# Patient Record
Sex: FEMALE | Race: WHITE | NOT HISPANIC OR LATINO | Employment: OTHER | URBAN - NONMETROPOLITAN AREA
[De-identification: names, ages, dates, MRNs, and addresses within clinical notes are randomized per-mention and may not be internally consistent; named-entity substitution may affect disease eponyms.]

---

## 2017-10-20 ENCOUNTER — OFFICE VISIT (OUTPATIENT)
Dept: INTERNAL MEDICINE | Facility: CLINIC | Age: 57
End: 2017-10-20

## 2017-10-20 ENCOUNTER — TELEPHONE (OUTPATIENT)
Dept: INTERNAL MEDICINE | Facility: CLINIC | Age: 57
End: 2017-10-20

## 2017-10-20 VITALS
DIASTOLIC BLOOD PRESSURE: 80 MMHG | SYSTOLIC BLOOD PRESSURE: 122 MMHG | TEMPERATURE: 97.9 F | HEART RATE: 73 BPM | OXYGEN SATURATION: 98 % | BODY MASS INDEX: 22.86 KG/M2 | RESPIRATION RATE: 12 BRPM | HEIGHT: 63 IN | WEIGHT: 129 LBS

## 2017-10-20 DIAGNOSIS — Z98.84 S/P GASTRIC BYPASS: Chronic | ICD-10-CM

## 2017-10-20 DIAGNOSIS — G43.909 MIGRAINE WITHOUT STATUS MIGRAINOSUS, NOT INTRACTABLE, UNSPECIFIED MIGRAINE TYPE: ICD-10-CM

## 2017-10-20 DIAGNOSIS — G89.4 CHRONIC PAIN SYNDROME: ICD-10-CM

## 2017-10-20 DIAGNOSIS — E55.9 VITAMIN D DEFICIENCY: ICD-10-CM

## 2017-10-20 DIAGNOSIS — Z86.711 HISTORY OF PULMONARY EMBOLISM: Chronic | ICD-10-CM

## 2017-10-20 DIAGNOSIS — M79.2 NERVE PAIN: Primary | ICD-10-CM

## 2017-10-20 DIAGNOSIS — M81.0 OSTEOPOROSIS, UNSPECIFIED OSTEOPOROSIS TYPE, UNSPECIFIED PATHOLOGICAL FRACTURE PRESENCE: ICD-10-CM

## 2017-10-20 DIAGNOSIS — J30.89 ENVIRONMENTAL AND SEASONAL ALLERGIES: ICD-10-CM

## 2017-10-20 DIAGNOSIS — Z23 NEED FOR VACCINATION: ICD-10-CM

## 2017-10-20 PROCEDURE — 90686 IIV4 VACC NO PRSV 0.5 ML IM: CPT | Performed by: FAMILY MEDICINE

## 2017-10-20 PROCEDURE — 90471 IMMUNIZATION ADMIN: CPT | Performed by: FAMILY MEDICINE

## 2017-10-20 PROCEDURE — 99204 OFFICE O/P NEW MOD 45 MIN: CPT | Performed by: FAMILY MEDICINE

## 2017-10-20 RX ORDER — TOPIRAMATE 100 MG/1
1 TABLET, FILM COATED ORAL 2 TIMES DAILY
Refills: 0 | COMMUNITY
Start: 2017-09-25 | End: 2017-10-20 | Stop reason: SDUPTHER

## 2017-10-20 RX ORDER — ALENDRONATE SODIUM 70 MG/1
70 TABLET ORAL WEEKLY
Qty: 4 TABLET | Refills: 1 | Status: SHIPPED | OUTPATIENT
Start: 2017-10-20 | End: 2017-12-20 | Stop reason: SDUPTHER

## 2017-10-20 RX ORDER — FLUTICASONE PROPIONATE 50 MCG
2 SPRAY, SUSPENSION (ML) NASAL DAILY
Qty: 1 BOTTLE | Refills: 1 | Status: SHIPPED | OUTPATIENT
Start: 2017-10-20 | End: 2018-05-22 | Stop reason: SDUPTHER

## 2017-10-20 RX ORDER — SUCRALFATE 1 G/1
1 TABLET ORAL 4 TIMES DAILY
Refills: 0 | COMMUNITY
Start: 2017-09-07 | End: 2017-10-20 | Stop reason: SDUPTHER

## 2017-10-20 RX ORDER — PROMETHAZINE HYDROCHLORIDE 25 MG/1
TABLET ORAL
COMMUNITY
Start: 2017-10-18 | End: 2018-05-08

## 2017-10-20 RX ORDER — FLUTICASONE PROPIONATE 50 MCG
SPRAY, SUSPENSION (ML) NASAL
Refills: 0 | COMMUNITY
Start: 2017-08-16 | End: 2017-10-20 | Stop reason: SDUPTHER

## 2017-10-20 RX ORDER — ALENDRONATE SODIUM 70 MG/1
1 TABLET ORAL WEEKLY
Refills: 0 | COMMUNITY
Start: 2017-10-01 | End: 2017-10-20 | Stop reason: SDUPTHER

## 2017-10-20 RX ORDER — FEXOFENADINE HCL 180 MG
1 TABLET ORAL
Refills: 0 | COMMUNITY
Start: 2017-10-02 | End: 2018-05-22 | Stop reason: SDUPTHER

## 2017-10-20 RX ORDER — SUCRALFATE 1 G/1
1 TABLET ORAL 4 TIMES DAILY
Qty: 120 TABLET | Refills: 1 | Status: SHIPPED | OUTPATIENT
Start: 2017-10-20 | End: 2017-12-20 | Stop reason: SDUPTHER

## 2017-10-20 RX ORDER — GABAPENTIN 800 MG/1
1 TABLET ORAL 4 TIMES DAILY
Refills: 0 | COMMUNITY
Start: 2017-10-09 | End: 2017-10-20 | Stop reason: SDUPTHER

## 2017-10-20 RX ORDER — HYDROXYZINE PAMOATE 25 MG/1
1 CAPSULE ORAL NIGHTLY
COMMUNITY
Start: 2017-10-18 | End: 2017-10-20 | Stop reason: SDUPTHER

## 2017-10-20 RX ORDER — ERGOCALCIFEROL 1.25 MG/1
50000 CAPSULE ORAL WEEKLY
Qty: 30 CAPSULE | Refills: 1 | Status: SHIPPED | OUTPATIENT
Start: 2017-10-20 | End: 2018-04-02 | Stop reason: SDUPTHER

## 2017-10-20 RX ORDER — METHOCARBAMOL 500 MG/1
500 TABLET, FILM COATED ORAL 4 TIMES DAILY PRN
Qty: 120 TABLET | Refills: 1 | Status: SHIPPED | OUTPATIENT
Start: 2017-10-20 | End: 2017-10-23 | Stop reason: SDUPTHER

## 2017-10-20 RX ORDER — TOPIRAMATE 100 MG/1
100 TABLET, FILM COATED ORAL 2 TIMES DAILY
Qty: 60 TABLET | Refills: 1 | Status: SHIPPED | OUTPATIENT
Start: 2017-10-20 | End: 2017-12-20 | Stop reason: SDUPTHER

## 2017-10-20 RX ORDER — PREDNISONE 20 MG/1
TABLET ORAL
COMMUNITY
Start: 2017-10-18 | End: 2017-10-20

## 2017-10-20 RX ORDER — ERGOCALCIFEROL 1.25 MG/1
1 CAPSULE ORAL WEEKLY
Refills: 1 | COMMUNITY
Start: 2017-10-01 | End: 2017-10-20 | Stop reason: SDUPTHER

## 2017-10-20 RX ORDER — HYDROXYZINE PAMOATE 25 MG/1
25 CAPSULE ORAL NIGHTLY
Qty: 30 CAPSULE | Refills: 1 | Status: SHIPPED | OUTPATIENT
Start: 2017-10-20 | End: 2017-11-20

## 2017-10-20 RX ORDER — GABAPENTIN 800 MG/1
800 TABLET ORAL 4 TIMES DAILY
Qty: 120 TABLET | Refills: 0 | Status: SHIPPED | OUTPATIENT
Start: 2017-10-20 | End: 2017-11-16 | Stop reason: SDUPTHER

## 2017-10-20 NOTE — TELEPHONE ENCOUNTER
States that when she picked up her meds that the muscle relaxer was not received. Can you please call that to [facility]?
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
## 2017-10-22 PROBLEM — Z98.84 S/P GASTRIC BYPASS: Chronic | Status: ACTIVE | Noted: 2017-10-22

## 2017-10-22 PROBLEM — Z86.711 HISTORY OF PULMONARY EMBOLISM: Chronic | Status: ACTIVE | Noted: 2017-10-22

## 2017-10-23 RX ORDER — METHOCARBAMOL 500 MG/1
500 TABLET, FILM COATED ORAL 4 TIMES DAILY PRN
Qty: 120 TABLET | Refills: 1 | Status: SHIPPED | OUTPATIENT
Start: 2017-10-23 | End: 2018-01-02 | Stop reason: SINTOL

## 2017-10-26 ENCOUNTER — OFFICE VISIT (OUTPATIENT)
Dept: INTERNAL MEDICINE | Facility: CLINIC | Age: 57
End: 2017-10-26

## 2017-10-26 VITALS
HEIGHT: 63 IN | OXYGEN SATURATION: 97 % | BODY MASS INDEX: 22.86 KG/M2 | HEART RATE: 81 BPM | DIASTOLIC BLOOD PRESSURE: 82 MMHG | TEMPERATURE: 97 F | WEIGHT: 129 LBS | SYSTOLIC BLOOD PRESSURE: 122 MMHG | RESPIRATION RATE: 12 BRPM

## 2017-10-26 DIAGNOSIS — R53.83 FATIGUE, UNSPECIFIED TYPE: Primary | ICD-10-CM

## 2017-10-26 DIAGNOSIS — E55.9 VITAMIN D INSUFFICIENCY: ICD-10-CM

## 2017-10-26 DIAGNOSIS — M25.50 POLYARTHRALGIA: ICD-10-CM

## 2017-10-26 DIAGNOSIS — R79.89 ABNORMAL CBC: ICD-10-CM

## 2017-10-26 DIAGNOSIS — G89.4 CHRONIC PAIN SYNDROME: ICD-10-CM

## 2017-10-26 DIAGNOSIS — R20.0 ARM NUMBNESS LEFT: ICD-10-CM

## 2017-10-26 DIAGNOSIS — R94.31 SHORTENED PR INTERVAL: ICD-10-CM

## 2017-10-26 DIAGNOSIS — R53.1 WEAKNESS: ICD-10-CM

## 2017-10-26 PROCEDURE — 99214 OFFICE O/P EST MOD 30 MIN: CPT | Performed by: FAMILY MEDICINE

## 2017-10-26 PROCEDURE — 93000 ELECTROCARDIOGRAM COMPLETE: CPT | Performed by: FAMILY MEDICINE

## 2017-10-26 NOTE — PROGRESS NOTES
Subjective    Connie Felty is a 57 y.o. female here for:  Chief Complaint   Patient presents with   • Shoulder Pain   • Tingling     in bilateral arms and hands, also c/o weakness     History of Present Illness   Having increased muscle pain in shoulders and hips. Arms have been tingling, also in shoulder blades. When she gets up it's in leg and hip area as well. It's like her motor skills are not normal. Joints don't feel they're working right. She feels she has no strength. Tried to  grandson but could not. Feels washed out. Has felt this way for a week and it's worsened. Both sides of arms and legs. Back on gabapentin, now able to sleep and has helped some nerve pain. She is thinking most of her issue is being off her narcotic pain reliever. At her first visit she felt she would be fine without it but now she is thinking medicine is needed. She feels symptoms started when she ran out of narcotic. No known coronary artery disease, but notes some numbness in the left arm and lips feel tingly. She was told at one time she had a heart attack but later another doctor said she did not. Patient has not had feeling like this before.    The following portions of the patient's history were reviewed and updated as appropriate: allergies, current medications, past family history, past medical history, past social history, past surgical history and problem list.    Review of Systems   Constitutional: Positive for activity change and fatigue.   Respiratory: Positive for shortness of breath.    Musculoskeletal: Positive for arthralgias, back pain and gait problem.   Neurological: Positive for weakness and numbness. Negative for syncope.       Vitals:    10/26/17 1149   BP: 122/82   Pulse: 81   Resp: 12   Temp: 97 °F (36.1 °C)   SpO2: 97%       Objective     Physical Exam   Constitutional: She is oriented to person, place, and time. Vital signs are normal. She appears well-developed and well-nourished. She is active. She  does not have a sickly appearance. She does not appear ill.   Appears stated age. Well groomed. normal body weight. Not standing and pacing this visit as she was last time but still appears a bit uncomfortable.   HENT:   Head: Normocephalic and atraumatic. Hair is normal.   Right Ear: Hearing normal.   Left Ear: Hearing normal.   Nose: Nose normal.   Eyes: EOM and lids are normal. Pupils are equal, round, and reactive to light. No scleral icterus.   Neck: Phonation normal. Neck supple.   Cardiovascular: Normal rate, regular rhythm and normal heart sounds.  Exam reveals no gallop and no friction rub.    No murmur heard.  Pulmonary/Chest: Effort normal and breath sounds normal.   Musculoskeletal: She exhibits no deformity.        Back:    Neurological: She is alert and oriented to person, place, and time. She displays no tremor. No cranial nerve deficit. Gait (limp slightly) abnormal.   Skin: Skin is warm. No rash noted. She is not diaphoretic. No cyanosis. No pallor. Nails show no clubbing.   Psychiatric: Her speech is normal and behavior is normal. Judgment and thought content normal. Her mood appears anxious. Cognition and memory are normal.   Nursing note and vitals reviewed.      ECG 12 Lead  Date/Time: 10/26/2017 12:35 PM  Performed by: DENZEL AGRAWAL  Authorized by: DENZEL AGRAWAL   Comparison: compared with previous ECG from 6/2/2017  Similar to previous ECG  Comparison to previous ECG: Old EKG page 349 in past records. Essentially no change.  Rhythm: sinus rhythm  Rate: bradycardic  BPM: 64  Conduction comments: Short MD interval (94 ms)  ST Segments: ST segments normal  T depression: V2 and V1  QRS axis: normal  Q waves: III, II and aVF (only one q wave aVF out of the two beats)  Clinical impression: abnormal ECG        I looked over her previous EKG which also noted a shortened MD interval. Past labs show at times WBC <4 but other times normal. She denies known issues with immune  system.    Assessment/Plan   Debra was seen today for shoulder pain and tingling.    Diagnoses and all orders for this visit:    Fatigue, unspecified type  -     CBC & Differential  -     Comprehensive Metabolic Panel  -     Iron Profile  -     Vitamin B12 & Folate  -     Vitamin D 25 Hydroxy  -     TSH  -     T4, Free  -     ECG 12 Lead    Arm numbness left  -     ECG 12 Lead    Weakness  -     ECG 12 Lead    Chronic pain syndrome  -     Ambulatory Referral to Pain Management    Polyarthralgia  -     CK  -     ARIELA  -     C-reactive Protein  -     Rheumatoid Factor, Quant  -     Sedimentation Rate    Abnormal CBC  -     CBC & Differential    Vitamin D insufficiency  -     Vitamin D 25 Hydroxy    Shortened MD interval  -     ECG 12 Lead      · Symptoms may be related to not having narcotic, but not definitive. Referring to pain clnic but no narcotic prescribed. Suggested topical NSAIDs, she says they don't help. I then encouraged use of capsaicin.  · EKG somewhat concerning for Erol-Ewdtuz-Ntgkqr syndrome (no delta waves to suggest WPW) but she does not have the clinical history to support this. Will monitor.          Eugenie Rivera MD

## 2017-10-30 LAB
25(OH)D3+25(OH)D2 SERPL-MCNC: 43.2 NG/ML
ALBUMIN SERPL-MCNC: 4.3 G/DL (ref 3.5–5)
ALBUMIN/GLOB SERPL: 1.8 G/DL (ref 1–2)
ALP SERPL-CCNC: 78 U/L (ref 38–126)
ALT SERPL-CCNC: 30 U/L (ref 13–69)
ANA SER QL: NEGATIVE
AST SERPL-CCNC: 28 U/L (ref 15–46)
BASOPHILS # BLD AUTO: 0.02 10*3/MM3 (ref 0–0.2)
BASOPHILS NFR BLD AUTO: 0.4 % (ref 0–2.5)
BILIRUB SERPL-MCNC: 0.6 MG/DL (ref 0.2–1.3)
BUN SERPL-MCNC: 12 MG/DL (ref 7–20)
BUN/CREAT SERPL: 13.3 (ref 7.1–23.5)
CALCIUM SERPL-MCNC: 9 MG/DL (ref 8.4–10.2)
CHLORIDE SERPL-SCNC: 109 MMOL/L (ref 98–107)
CK SERPL-CCNC: 39 U/L (ref 30–170)
CO2 SERPL-SCNC: 22 MMOL/L (ref 26–30)
CREAT SERPL-MCNC: 0.9 MG/DL (ref 0.6–1.3)
CRP SERPL-MCNC: <0.5 MG/DL (ref 0–1)
EOSINOPHIL # BLD AUTO: 0.09 10*3/MM3 (ref 0–0.7)
EOSINOPHIL NFR BLD AUTO: 1.6 % (ref 0–7)
ERYTHROCYTE [DISTWIDTH] IN BLOOD BY AUTOMATED COUNT: 13.6 % (ref 11.5–14.5)
ERYTHROCYTE [SEDIMENTATION RATE] IN BLOOD BY WESTERGREN METHOD: 2 MM/HR (ref 0–20)
FOLATE SERPL-MCNC: 5.66 NG/ML
GFR SERPLBLD CREATININE-BSD FMLA CKD-EPI: 65 ML/MIN/1.73
GFR SERPLBLD CREATININE-BSD FMLA CKD-EPI: 78 ML/MIN/1.73
GLOBULIN SER CALC-MCNC: 2.4 GM/DL
GLUCOSE SERPL-MCNC: 111 MG/DL (ref 74–98)
HCT VFR BLD AUTO: 47.3 % (ref 37–47)
HGB BLD-MCNC: 14.8 G/DL (ref 12–16)
IMM GRANULOCYTES # BLD: 0.02 10*3/MM3 (ref 0–0.06)
IMM GRANULOCYTES NFR BLD: 0.4 % (ref 0–0.6)
IRON SATN MFR SERPL: 34 % (ref 11–46)
IRON SERPL-MCNC: 130 MCG/DL (ref 37–181)
LYMPHOCYTES # BLD AUTO: 2.79 10*3/MM3 (ref 0.6–3.4)
LYMPHOCYTES NFR BLD AUTO: 49.5 % (ref 10–50)
MCH RBC QN AUTO: 29.1 PG (ref 27–31)
MCHC RBC AUTO-ENTMCNC: 31.3 G/DL (ref 30–37)
MCV RBC AUTO: 92.9 FL (ref 81–99)
MONOCYTES # BLD AUTO: 0.36 10*3/MM3 (ref 0–0.9)
MONOCYTES NFR BLD AUTO: 6.4 % (ref 0–12)
NEUTROPHILS # BLD AUTO: 2.36 10*3/MM3 (ref 2–6.9)
NEUTROPHILS NFR BLD AUTO: 41.7 % (ref 37–80)
NRBC BLD AUTO-RTO: 0 /100 WBC (ref 0–0)
PLATELET # BLD AUTO: 191 10*3/MM3 (ref 130–400)
POTASSIUM SERPL-SCNC: 3.9 MMOL/L (ref 3.5–5.1)
PROT SERPL-MCNC: 6.7 G/DL (ref 6.3–8.2)
RBC # BLD AUTO: 5.09 10*6/MM3 (ref 4.2–5.4)
RHEUMATOID FACT SERPL-ACNC: <10 IU/ML (ref 0–13.9)
SODIUM SERPL-SCNC: 145 MMOL/L (ref 137–145)
T4 FREE SERPL-MCNC: 0.79 NG/DL (ref 0.78–2.19)
TIBC SERPL-MCNC: 386 MCG/DL (ref 261–497)
TSH SERPL DL<=0.005 MIU/L-ACNC: 1.92 MIU/ML (ref 0.47–4.68)
UIBC SERPL-MCNC: 256 MCG/DL
VIT B12 SERPL-MCNC: >1000 PG/ML (ref 239–931)
WBC # BLD AUTO: 5.64 10*3/MM3 (ref 4.8–10.8)

## 2017-11-03 ENCOUNTER — TRANSCRIBE ORDERS (OUTPATIENT)
Dept: ADMINISTRATIVE | Facility: HOSPITAL | Age: 57
End: 2017-11-03

## 2017-11-03 DIAGNOSIS — M54.2 CERVICALGIA: ICD-10-CM

## 2017-11-03 DIAGNOSIS — M54.12 RADICULOPATHY, CERVICAL: Primary | ICD-10-CM

## 2017-11-16 DIAGNOSIS — G89.4 CHRONIC PAIN SYNDROME: ICD-10-CM

## 2017-11-16 DIAGNOSIS — M79.2 NERVE PAIN: ICD-10-CM

## 2017-11-17 ENCOUNTER — HOSPITAL ENCOUNTER (OUTPATIENT)
Dept: GENERAL RADIOLOGY | Facility: HOSPITAL | Age: 57
Discharge: HOME | End: 2017-11-17
Admitting: ANESTHESIOLOGY

## 2017-11-17 ENCOUNTER — HOSPITAL ENCOUNTER (OUTPATIENT)
Dept: CT IMAGING | Facility: HOSPITAL | Age: 57
Discharge: HOME | End: 2017-11-17

## 2017-11-17 VITALS
OXYGEN SATURATION: 99 % | DIASTOLIC BLOOD PRESSURE: 72 MMHG | TEMPERATURE: 97.7 F | WEIGHT: 135.2 LBS | HEIGHT: 63 IN | BODY MASS INDEX: 23.96 KG/M2 | SYSTOLIC BLOOD PRESSURE: 127 MMHG | RESPIRATION RATE: 18 BRPM | HEART RATE: 88 BPM

## 2017-11-17 DIAGNOSIS — M54.2 CERVICALGIA: ICD-10-CM

## 2017-11-17 DIAGNOSIS — M54.12 RADICULOPATHY, CERVICAL: ICD-10-CM

## 2017-11-17 PROCEDURE — 72240 MYELOGRAPHY NECK SPINE: CPT

## 2017-11-17 PROCEDURE — 72125 CT NECK SPINE W/O DYE: CPT

## 2017-11-17 PROCEDURE — 0 IOPAMIDOL 61 % SOLUTION: Performed by: PHYSICIAN ASSISTANT

## 2017-11-17 RX ORDER — ALPRAZOLAM 0.5 MG/1
0.5 TABLET ORAL
Status: DISCONTINUED | OUTPATIENT
Start: 2017-11-17 | End: 2017-11-18 | Stop reason: HOSPADM

## 2017-11-17 RX ORDER — LIDOCAINE HYDROCHLORIDE 10 MG/ML
10 INJECTION, SOLUTION INFILTRATION; PERINEURAL ONCE
Status: DISCONTINUED | OUTPATIENT
Start: 2017-11-17 | End: 2017-11-17

## 2017-11-17 RX ORDER — AMITRIPTYLINE HYDROCHLORIDE 25 MG/1
25 TABLET, FILM COATED ORAL NIGHTLY
COMMUNITY
End: 2017-11-20

## 2017-11-17 RX ORDER — GABAPENTIN 800 MG/1
800 TABLET ORAL 4 TIMES DAILY
Qty: 120 TABLET | Refills: 2 | Status: SHIPPED | OUTPATIENT
Start: 2017-11-17 | End: 2017-11-20

## 2017-11-17 RX ADMIN — ALPRAZOLAM 0.5 MG: 0.5 TABLET ORAL at 08:15

## 2017-11-17 RX ADMIN — LIDOCAINE HYDROCHLORIDE 10 ML: 10 INJECTION, SOLUTION INFILTRATION; PERINEURAL at 11:30

## 2017-11-17 RX ADMIN — IOPAMIDOL 15 ML: 612 INJECTION, SOLUTION INTRATHECAL at 11:32

## 2017-11-17 NOTE — POST-PROCEDURE NOTE
Radiology Procedure    Pre-procedure: procedure, risks discussed with patient. Patient understood and consented to procedure     Procedure Performed: cervical myelogram     IV Sedation and/or Anesthesia:  No    Complications: none    Preliminary Findings: pending    Specimen Removed: none    Estimated Blood Loss:  0ml    Post-Procedure Diagnosis: ct C spine, encourage fluids, bed rest x hours    Post-Procedure Plan: ct C spine, encourage fluids, bed rest x hours    Standard Discharge Instructions Given:yes     NIMISHA Francois  11/17/17  11:51 AM

## 2017-11-17 NOTE — PLAN OF CARE
Problem: Myelogram (Adult)  Goal: Signs and Symptoms of Listed Potential Problems Will be Absent or Manageable (Myelogram)  Outcome: Ongoing (interventions implemented as appropriate)    11/17/17 1501   Myelogram   Problems Assessed (Myelogram) all   Problems Present (Myelogram) none

## 2017-11-20 ENCOUNTER — OFFICE VISIT (OUTPATIENT)
Dept: INTERNAL MEDICINE | Facility: CLINIC | Age: 57
End: 2017-11-20

## 2017-11-20 ENCOUNTER — TELEPHONE (OUTPATIENT)
Dept: INTERVENTIONAL RADIOLOGY/VASCULAR | Facility: HOSPITAL | Age: 57
End: 2017-11-20

## 2017-11-20 VITALS
OXYGEN SATURATION: 97 % | TEMPERATURE: 97.6 F | RESPIRATION RATE: 12 BRPM | HEART RATE: 76 BPM | SYSTOLIC BLOOD PRESSURE: 124 MMHG | HEIGHT: 63 IN | DIASTOLIC BLOOD PRESSURE: 74 MMHG | BODY MASS INDEX: 23.92 KG/M2 | WEIGHT: 135 LBS

## 2017-11-20 DIAGNOSIS — R60.9 PERIPHERAL EDEMA: ICD-10-CM

## 2017-11-20 DIAGNOSIS — R20.0 LEFT FACIAL NUMBNESS: Primary | ICD-10-CM

## 2017-11-20 DIAGNOSIS — G89.4 CHRONIC PAIN SYNDROME: ICD-10-CM

## 2017-11-20 DIAGNOSIS — H91.92 HEARING LOSS OF LEFT EAR, UNSPECIFIED HEARING LOSS TYPE: ICD-10-CM

## 2017-11-20 DIAGNOSIS — G47.9 TROUBLE IN SLEEPING: ICD-10-CM

## 2017-11-20 PROCEDURE — 99214 OFFICE O/P EST MOD 30 MIN: CPT | Performed by: FAMILY MEDICINE

## 2017-11-20 RX ORDER — ZOLPIDEM TARTRATE 5 MG/1
5 TABLET ORAL NIGHTLY PRN
Qty: 30 TABLET | Refills: 1 | Status: SHIPPED | OUTPATIENT
Start: 2017-11-20 | End: 2018-01-02
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
## 2017-11-20 NOTE — PATIENT INSTRUCTIONS
Insomnia  Insomnia is a sleep disorder that makes it difficult to fall asleep or to stay asleep. Insomnia can cause tiredness (fatigue), low energy, difficulty concentrating, mood swings, and poor performance at work or school.   There are three different ways to classify insomnia:   · Difficulty falling asleep.  · Difficulty staying asleep.  · Waking up too early in the morning.  Any type of insomnia can be long-term (chronic) or short-term (acute). Both are common. Short-term insomnia usually lasts for three months or less. Chronic insomnia occurs at least three times a week for longer than three months.  CAUSES   Insomnia may be caused by another condition, situation, or substance, such as:  · Anxiety.  · Certain medicines.  · Gastroesophageal reflux disease (GERD) or other gastrointestinal conditions.  · Asthma or other breathing conditions.  · Restless legs syndrome, sleep apnea, or other sleep disorders.  · Chronic pain.  · Menopause. This may include hot flashes.  · Stroke.  · Abuse of alcohol, tobacco, or illegal drugs.  · Depression.  · Caffeine.    · Neurological disorders, such as Alzheimer disease.  · An overactive thyroid (hyperthyroidism).  The cause of insomnia may not be known.  RISK FACTORS  Risk factors for insomnia include:  · Gender. Women are more commonly affected than men.  · Age. Insomnia is more common as you get older.  · Stress. This may involve your professional or personal life.  · Income. Insomnia is more common in people with lower income.  · Lack of exercise.    · Irregular work schedule or night shifts.  · Traveling between different time zones.  SIGNS AND SYMPTOMS  If you have insomnia, trouble falling asleep or trouble staying asleep is the main symptom. This may lead to other symptoms, such as:  · Feeling fatigued.  · Feeling nervous about going to sleep.  · Not feeling rested in the morning.  · Having trouble concentrating.  · Feeling irritable, anxious, or depressed.  TREATMENT    Treatment for insomnia depends on the cause. If your insomnia is caused by an underlying condition, treatment will focus on addressing the condition. Treatment may also include:   · Medicines to help you sleep.  · Counseling or therapy.  · Lifestyle adjustments.  HOME CARE INSTRUCTIONS   · Take medicines only as directed by your health care provider.  · Keep regular sleeping and waking hours. Avoid naps.  · Keep a sleep diary to help you and your health care provider figure out what could be causing your insomnia. Include:      When you sleep.    When you wake up during the night.    How well you sleep.      How rested you feel the next day.    Any side effects of medicines you are taking.    What you eat and drink.    · Make your bedroom a comfortable place where it is easy to fall asleep:    Put up shades or special blackout curtains to block light from outside.    Use a white noise machine to block noise.    Keep the temperature cool.    · Exercise regularly as directed by your health care provider. Avoid exercising right before bedtime.  · Use relaxation techniques to manage stress. Ask your health care provider to suggest some techniques that may work well for you. These may include:    Breathing exercises.    Routines to release muscle tension.    Visualizing peaceful scenes.  · Cut back on alcohol, caffeinated beverages, and cigarettes, especially close to bedtime. These can disrupt your sleep.  · Do not overeat or eat spicy foods right before bedtime. This can lead to digestive discomfort that can make it hard for you to sleep.  · Limit screen use before bedtime. This includes:    Watching TV.    Using your smartphone, tablet, and computer.  · Stick to a routine. This can help you fall asleep faster. Try to do a quiet activity, brush your teeth, and go to bed at the same time each night.  · Get out of bed if you are still awake after 15 minutes of trying to sleep. Keep the lights down, but try reading or  doing a quiet activity. When you feel sleepy, go back to bed.  · Make sure that you drive carefully. Avoid driving if you feel very sleepy.  · Keep all follow-up appointments as directed by your health care provider. This is important.  SEEK MEDICAL CARE IF:   · You are tired throughout the day or have trouble in your daily routine due to sleepiness.  · You continue to have sleep problems or your sleep problems get worse.  SEEK IMMEDIATE MEDICAL CARE IF:   · You have serious thoughts about hurting yourself or someone else.     This information is not intended to replace advice given to you by your health care provider. Make sure you discuss any questions you have with your health care provider.     Document Released: 12/15/2001 Document Revised: 09/07/2016 Document Reviewed: 09/18/2015  [employer] Interactive Patient Education ©2017 [employer]  · Follow up with pain management  · Setting up for imaging of brain due to symptoms  · Monitor edema, elevate feet when resting  · May try Ambien as needed for sleep  · Go to ER for any changes in vision, weakness, change in numbness, speech difficulties, etc.

## 2017-11-20 NOTE — TELEPHONE ENCOUNTER
@[id number]@    Other Comments:

## 2017-11-29 ENCOUNTER — HOSPITAL ENCOUNTER (OUTPATIENT)
Dept: MRI IMAGING | Facility: HOSPITAL | Age: 57
Discharge: HOME | End: 2017-11-29
Admitting: FAMILY MEDICINE

## 2017-11-29 DIAGNOSIS — H91.92 HEARING LOSS OF LEFT EAR, UNSPECIFIED HEARING LOSS TYPE: ICD-10-CM

## 2017-11-29 DIAGNOSIS — R20.0 LEFT FACIAL NUMBNESS: ICD-10-CM

## 2017-11-29 PROCEDURE — 70551 MRI BRAIN STEM W/O DYE: CPT

## 2017-11-30 ENCOUNTER — TELEPHONE (OUTPATIENT)
Dept: INTERNAL MEDICINE | Facility: CLINIC | Age: 57
End: 2017-11-30

## 2017-12-04 ENCOUNTER — TELEPHONE (OUTPATIENT)
Dept: INTERNAL MEDICINE | Facility: CLINIC | Age: 57
End: 2017-12-04

## 2017-12-20 DIAGNOSIS — G89.4 CHRONIC PAIN SYNDROME: ICD-10-CM

## 2017-12-20 DIAGNOSIS — M81.0 OSTEOPOROSIS, UNSPECIFIED OSTEOPOROSIS TYPE, UNSPECIFIED PATHOLOGICAL FRACTURE PRESENCE: ICD-10-CM

## 2017-12-20 DIAGNOSIS — G43.909 MIGRAINE WITHOUT STATUS MIGRAINOSUS, NOT INTRACTABLE, UNSPECIFIED MIGRAINE TYPE: ICD-10-CM

## 2017-12-20 RX ORDER — ALENDRONATE SODIUM 70 MG/1
TABLET ORAL
Qty: 4 TABLET | Refills: 0 | Status: SHIPPED | OUTPATIENT
Start: 2017-12-20 | End: 2018-01-02 | Stop reason: SDUPTHER

## 2017-12-20 RX ORDER — TOPIRAMATE 100 MG/1
100 TABLET, FILM COATED ORAL 2 TIMES DAILY
Qty: 60 TABLET | Refills: 1 | Status: SHIPPED | OUTPATIENT
Start: 2017-12-20 | End: 2018-01-02 | Stop reason: SDUPTHER

## 2017-12-20 RX ORDER — TOPIRAMATE 100 MG/1
TABLET, FILM COATED ORAL
Qty: 60 TABLET | Refills: 0 | Status: SHIPPED | OUTPATIENT
Start: 2017-12-20 | End: 2018-01-02 | Stop reason: SDUPTHER

## 2017-12-20 RX ORDER — ALENDRONATE SODIUM 70 MG/1
70 TABLET ORAL WEEKLY
Qty: 4 TABLET | Refills: 1 | Status: SHIPPED | OUTPATIENT
Start: 2017-12-20 | End: 2018-03-01 | Stop reason: SDUPTHER

## 2017-12-20 RX ORDER — SUCRALFATE 1 G/1
TABLET ORAL
Qty: 100 TABLET | Refills: 0 | Status: SHIPPED | OUTPATIENT
Start: 2017-12-20 | End: 2018-01-18 | Stop reason: SDUPTHER

## 2017-12-20 NOTE — TELEPHONE ENCOUNTER
Patient called the office requesting a refill on the attached Rx's. She is not sure if she is needing a refill on any others due to the bottles not being with her.

## 2018-01-02 ENCOUNTER — OFFICE VISIT (OUTPATIENT)
Dept: INTERNAL MEDICINE | Facility: CLINIC | Age: 58
End: 2018-01-02

## 2018-01-02 VITALS
HEART RATE: 76 BPM | DIASTOLIC BLOOD PRESSURE: 70 MMHG | OXYGEN SATURATION: 98 % | HEIGHT: 63 IN | RESPIRATION RATE: 12 BRPM | SYSTOLIC BLOOD PRESSURE: 110 MMHG | WEIGHT: 133 LBS | TEMPERATURE: 97.1 F | BODY MASS INDEX: 23.57 KG/M2

## 2018-01-02 DIAGNOSIS — M25.612 DECREASED ROM OF LEFT SHOULDER: ICD-10-CM

## 2018-01-02 DIAGNOSIS — G89.29 CHRONIC LEFT SHOULDER PAIN: Primary | ICD-10-CM

## 2018-01-02 DIAGNOSIS — G89.4 CHRONIC PAIN SYNDROME: ICD-10-CM

## 2018-01-02 DIAGNOSIS — G43.909 MIGRAINE WITHOUT STATUS MIGRAINOSUS, NOT INTRACTABLE, UNSPECIFIED MIGRAINE TYPE: ICD-10-CM

## 2018-01-02 DIAGNOSIS — M79.2 NERVE PAIN: ICD-10-CM

## 2018-01-02 DIAGNOSIS — M25.512 CHRONIC LEFT SHOULDER PAIN: Primary | ICD-10-CM

## 2018-01-02 DIAGNOSIS — Z12.39 BREAST CANCER SCREENING: ICD-10-CM

## 2018-01-02 PROCEDURE — 99214 OFFICE O/P EST MOD 30 MIN: CPT | Performed by: FAMILY MEDICINE

## 2018-01-02 RX ORDER — GABAPENTIN 800 MG/1
800 TABLET ORAL 4 TIMES DAILY
Qty: 120 TABLET | Refills: 2 | Status: SHIPPED | OUTPATIENT
Start: 2018-01-02 | End: 2018-04-02 | Stop reason: SDUPTHER

## 2018-01-02 RX ORDER — TOPIRAMATE 100 MG/1
100 TABLET, FILM COATED ORAL 2 TIMES DAILY
Qty: 60 TABLET | Refills: 5 | Status: SHIPPED | OUTPATIENT
Start: 2018-01-02 | End: 2018-04-02 | Stop reason: SDUPTHER
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
## 2018-01-08 ENCOUNTER — HOSPITAL ENCOUNTER (OUTPATIENT)
Dept: MRI IMAGING | Facility: HOSPITAL | Age: 58
Discharge: HOME | End: 2018-01-08
Admitting: FAMILY MEDICINE

## 2018-01-08 PROCEDURE — 73221 MRI JOINT UPR EXTREM W/O DYE: CPT

## 2018-01-18 ENCOUNTER — TELEPHONE (OUTPATIENT)
Dept: INTERNAL MEDICINE | Facility: CLINIC | Age: 58
End: 2018-01-18

## 2018-01-18 DIAGNOSIS — M25.519 CHRONIC SHOULDER PAIN, UNSPECIFIED LATERALITY: Primary | ICD-10-CM

## 2018-01-18 DIAGNOSIS — G89.29 CHRONIC SHOULDER PAIN, UNSPECIFIED LATERALITY: Primary | ICD-10-CM

## 2018-01-18 RX ORDER — SUCRALFATE 1 G/1
TABLET ORAL
Qty: 100 TABLET | Refills: 1 | Status: SHIPPED | OUTPATIENT
Start: 2018-01-18 | End: 2018-04-02 | Stop reason: SDUPTHER

## 2018-01-31 DIAGNOSIS — M25.512 LEFT SHOULDER PAIN, UNSPECIFIED CHRONICITY: Primary | ICD-10-CM

## 2018-02-01 ENCOUNTER — OFFICE VISIT (OUTPATIENT)
Dept: ORTHOPEDIC SURGERY | Facility: CLINIC | Age: 58
End: 2018-02-01

## 2018-02-01 VITALS — HEIGHT: 63 IN | BODY MASS INDEX: 22.5 KG/M2 | RESPIRATION RATE: 16 BRPM | WEIGHT: 127 LBS

## 2018-02-01 DIAGNOSIS — M25.512 LEFT SHOULDER PAIN, UNSPECIFIED CHRONICITY: Primary | ICD-10-CM

## 2018-02-01 DIAGNOSIS — M75.02 ADHESIVE CAPSULITIS OF LEFT SHOULDER: ICD-10-CM

## 2018-02-01 DIAGNOSIS — M54.12 CERVICAL RADICULOPATHY AT C6: ICD-10-CM

## 2018-02-01 PROCEDURE — 99204 OFFICE O/P NEW MOD 45 MIN: CPT | Performed by: ORTHOPAEDIC SURGERY

## 2018-02-01 NOTE — PROGRESS NOTES
Subjective   Patient ID: Connie May Felty is a 57 y.o. female  Pain and Consult of the Left Shoulder (Pt is here today to be seen at the request of Dr. Rivera. Pt stated had a tear about 3 yrs ago. Pt states had fallen about a year after the tear. Pt has been experiencing pain over the last two years. Pt is right hand dominant.)             History of Present Illness    Patient complains of left anterior shoulder pain has had multiple treatment modalities including surgery November for debridement left shoulder after that surgery did not have pain relief reached a 0.1 month postop or therapy [facility] is not much more she could do.  Plans to move this area would like to get reevaluated and have follow-up treated here.  Also less history of a prior suture anchor placement left shoulder and subsequent frozen shoulder treated with more multimodal therapy with minimal improvement prior to the recent surgery in November.  Medical history also positive for 2 prior cervical disc operations currently followed by neurosurgery anticipating additional surgery at the C6 7 level.  Recent facet block done to the neck improved left arm pain.    Pain is localized to the proximal lateral upper arm no pain at rest forward active elevation does reproduce some pain but not all of it, pain does radiate down to the hand with paresthesias and tingling in the fingertips.    Review of Systems   Constitutional: Negative for diaphoresis, fever and unexpected weight change.   HENT: Negative for dental problem and sore throat.    Eyes: Negative for visual disturbance.   Respiratory: Negative for shortness of breath.    Cardiovascular: Negative for chest pain.   Gastrointestinal: Negative for abdominal pain, constipation, diarrhea, nausea and vomiting.   Genitourinary: Negative for difficulty urinating and frequency.   Musculoskeletal: Positive for arthralgias.   Neurological: Negative for headaches.   Hematological: Does not bruise/bleed easily.    All other systems reviewed and are negative.      Past Medical History:   Diagnosis Date   • Anemia     iron deficiency per records   • Anxiety    • Arthritis    • B12 deficiency    • Compression fracture of spine    • Elevated bilirubin 02/2013   • Fractures    • Intussusception 2014   • Migraine    • Migraine    • Mitral incompetence    • Neuropathy    • Osteoporosis     Has had compression fracture.   • Pelvic fracture 2014    Slipped on ice. Surgical repair [place], [place]. [facility].   • Postmenopausal     Age 42   • Pulmonary embolism 10/2013   • Sepsis 10/2013   • Small bowel perforation 2014   • Vitamin D deficiency         Past Surgical History:   Procedure Laterality Date   • CERVICAL FUSION  2007    C4-C7   • FOOT SURGERY     • GASTRIC BYPASS     • KNEE SURGERY     • NECK SURGERY      C 4-7   • SHOULDER SURGERY     • SMALL INTESTINE SURGERY     • TOE AMPUTATION Left 2005    Toe #2 and #3 due to trauma   • TOTAL ABDOMINAL HYSTERECTOMY WITH SALPINGO OOPHORECTOMY     • TOTAL HIP ARTHROPLASTY Left 2005       Family History   Problem Relation Age of Onset   • Arthritis Mother    • Diabetes Mother    • Heart disease Mother    • Hypertension Mother    • Asthma Mother    • Ovarian cancer Sister    • Lupus Sister    • Lung disease Sister    • Heart disease Sister    • Diabetes Father    • Heart disease Father    • Heart disease Brother    • Heart attack Brother    • Diabetes Sister    • Heart disease Sister        Social History     Social History   • Marital status:      Spouse name: N/A   • Number of children: N/A   • Years of education: N/A     Occupational History   • Not on file.     Social History Main Topics   • Smoking status: Never Smoker   • Smokeless tobacco: Never Used   • Alcohol use No   • Drug use: No   • Sexual activity: Defer     Other Topics Concern   • Not on file     Social History Narrative       I have reviewed all of the above social hx, family hx, surgical hx,  "medications, allergies & ROS and confirm that it is accurate.    Allergies   Allergen Reactions   • Aspirin      Due to gastric bypass   • Lidoderm [Lidocaine]      Per records, visual changes and numbness   • Nsaids      Gastric bypass       Objective   Resp 16  Ht 160 cm (62.99\")  Wt 57.6 kg (127 lb)  BMI 22.5 kg/m2   Physical Exam  Constitutional: Patient is oriented to person, place, and time. Patient appears well-developed and well-nourished.   HENT:Head: Normocephalic and atraumatic.   Eyes: EOM are normal. Pupils are equal, round, and reactive to light.   Neck: Normal range of motion. Neck supple.   Cardiovascular: Normal rate.    Pulmonary/Chest: Effort normal and breath sounds normal.   Abdominal: Soft.   Neurological: Patient is alert and oriented to person, place, and time.   Skin: Skin is warm and dry.   Psychiatric: Patient has a normal mood and affect.   Nursing note and vitals reviewed.       Ortho Exam   Left arm with well-healed incisions lateral and anterior consistent with mini open rotator cuff repair and prior arthroscopic surgery, forward elevation to 70° abduction 60° extension 30° external rotation 30° internal rotation 20°.  Strength before flexion 5 over 5, strength with extension 5 over 5, subscapularis infraspinatus teres minor supraspinatus strength all 5 over 5.    Left arm with diminished sensation dorsal forearm dorsal hand consistent with radiculopathy      Assessment/Plan   Review of Radiographic Studies:    Radiographic images today of affected area I personally viewed and showed no sign of acute fracture or dislocation.      Procedures     Debra was seen today for pain and consult.    Diagnoses and all orders for this visit:    Left shoulder pain, unspecified chronicity    Cervical radiculopathy at C6    Adhesive capsulitis of left shoulder     Orthopedic activities reviewed and patient expressed appreciation, Risk, benefits, and merits of treatment alternatives reviewed with " the patient and questions answered and Physical therapy referral given      Recommendations/Plan:   Exercise, medications, injections, other patient advice, and return appointment as noted.    Patient agreeable to call or return sooner for any concerns.             Impression:  History of left shoulder pain frozen shoulder rotator cuff repair suture anchor subsequent stiffness subsequent arthroscopic surgery with minimal improvement, left arm pain paresthesias cervical radiculopathy multiple cervical surgeries with anticipation of more cervical surgeries.  Patient narcotic dependent at this time.    Plan:Recommend discontinuation of use of narcotics, continue home exercise, nonsurgical treatment for shoulder condition, follow-up with me in 3-4 months.  Patient will attempt to obtain records of most recent surgery in November 2017.

## 2018-03-01 DIAGNOSIS — M81.0 OSTEOPOROSIS, UNSPECIFIED OSTEOPOROSIS TYPE, UNSPECIFIED PATHOLOGICAL FRACTURE PRESENCE: ICD-10-CM

## 2018-03-01 RX ORDER — ALENDRONATE SODIUM 70 MG/1
TABLET ORAL
Qty: 4 TABLET | Refills: 0 | Status: SHIPPED | OUTPATIENT
Start: 2018-03-01 | End: 2018-04-02 | Stop reason: SDUPTHER

## 2018-04-02 ENCOUNTER — OFFICE VISIT (OUTPATIENT)
Dept: INTERNAL MEDICINE | Facility: CLINIC | Age: 58
End: 2018-04-02

## 2018-04-02 VITALS
BODY MASS INDEX: 22.15 KG/M2 | RESPIRATION RATE: 12 BRPM | HEIGHT: 63 IN | HEART RATE: 96 BPM | DIASTOLIC BLOOD PRESSURE: 64 MMHG | SYSTOLIC BLOOD PRESSURE: 102 MMHG | WEIGHT: 125 LBS | TEMPERATURE: 98.5 F | OXYGEN SATURATION: 98 %

## 2018-04-02 DIAGNOSIS — M81.0 OSTEOPOROSIS, UNSPECIFIED OSTEOPOROSIS TYPE, UNSPECIFIED PATHOLOGICAL FRACTURE PRESENCE: ICD-10-CM

## 2018-04-02 DIAGNOSIS — G89.4 CHRONIC PAIN SYNDROME: Primary | ICD-10-CM

## 2018-04-02 DIAGNOSIS — Z11.59 NEED FOR HEPATITIS C SCREENING TEST: ICD-10-CM

## 2018-04-02 DIAGNOSIS — IMO0001 NORMAL BODY MASS INDEX (BMI): ICD-10-CM

## 2018-04-02 DIAGNOSIS — G43.909 MIGRAINE WITHOUT STATUS MIGRAINOSUS, NOT INTRACTABLE, UNSPECIFIED MIGRAINE TYPE: ICD-10-CM

## 2018-04-02 DIAGNOSIS — E53.8 B12 DEFICIENCY: ICD-10-CM

## 2018-04-02 DIAGNOSIS — M79.2 NERVE PAIN: ICD-10-CM

## 2018-04-02 DIAGNOSIS — E55.9 VITAMIN D DEFICIENCY: ICD-10-CM

## 2018-04-02 PROCEDURE — 99213 OFFICE O/P EST LOW 20 MIN: CPT | Performed by: FAMILY MEDICINE

## 2018-04-02 RX ORDER — GABAPENTIN 800 MG/1
800 TABLET ORAL 4 TIMES DAILY
Qty: 120 TABLET | Refills: 2 | Status: SHIPPED | OUTPATIENT
Start: 2018-04-02 | End: 2018-08-07 | Stop reason: SDUPTHER

## 2018-04-02 RX ORDER — SUCRALFATE 1 G/1
TABLET ORAL
Qty: 100 TABLET | Refills: 1 | Status: SHIPPED | OUTPATIENT
Start: 2018-04-02 | End: 2018-10-15 | Stop reason: SDUPTHER

## 2018-04-02 RX ORDER — ALENDRONATE SODIUM 70 MG/1
70 TABLET ORAL WEEKLY
Qty: 4 TABLET | Refills: 5 | Status: SHIPPED | OUTPATIENT
Start: 2018-04-02 | End: 2018-10-15

## 2018-04-02 RX ORDER — TOPIRAMATE 100 MG/1
100 TABLET, FILM COATED ORAL 2 TIMES DAILY
Qty: 60 TABLET | Refills: 5 | Status: SHIPPED | OUTPATIENT
Start: 2018-04-02 | End: 2018-05-08 | Stop reason: SDUPTHER

## 2018-04-02 RX ORDER — ERGOCALCIFEROL 1.25 MG/1
50000 CAPSULE ORAL WEEKLY
Qty: 30 CAPSULE | Refills: 1 | Status: SHIPPED | OUTPATIENT
Start: 2018-04-02 | End: 2018-04-03

## 2018-04-02 NOTE — PROGRESS NOTES
"Subjective    Connie May Felty is a 57 y.o. female here for:  Chief Complaint   Patient presents with   • chronic pain syndrome   • nerve pain     History of Present Illness     Patient here for normal follow up and gabapentin refill. Medicine continues to help with the burning down leg. No new complaints. Up to date on DEXA and colonoscopy she reports. Had had mammogram. Previously took vitamin B12 shots and requesting refill on vitamin D supplement due to deficiency.     The following portions of the patient's history were reviewed and updated as appropriate: allergies, current medications, past family history, past medical history, past social history, past surgical history and problem list.    Review of Systems   Constitutional: Negative for activity change.   Respiratory: Negative for shortness of breath.    Musculoskeletal: Positive for arthralgias.   Neurological: Positive for numbness.       Vitals:    04/02/18 0839   BP: 102/64   Pulse: 96   Resp: 12   Temp: 98.5 °F (36.9 °C)   SpO2: 98%   Weight: 56.7 kg (125 lb)   Height: 160 cm (62.99\")         Objective   Physical Exam   Constitutional: She is oriented to person, place, and time. Vital signs are normal. She appears well-developed and well-nourished. She is active.  Non-toxic appearance. She does not appear ill. No distress. She is not overweight.  HENT:   Head: Normocephalic and atraumatic. Hair is normal.   Right Ear: Hearing and external ear normal.   Left Ear: Hearing and external ear normal.   Nose: Nose normal.   Mouth/Throat: Mucous membranes are not dry.   Eyes: EOM and lids are normal. Pupils are equal, round, and reactive to light. No scleral icterus.   Neck: Neck supple.   Cardiovascular: Normal rate, regular rhythm and normal heart sounds.    No murmur heard.  Pulmonary/Chest: Effort normal and breath sounds normal.   Neurological: She is alert and oriented to person, place, and time. She displays no tremor. No cranial nerve deficit. Gait " abnormal.   Skin: Skin is warm and dry. No rash noted. She is not diaphoretic. No cyanosis. Nails show no clubbing.   Psychiatric: She has a normal mood and affect. Her speech is normal and behavior is normal. Judgment and thought content normal. Cognition and memory are normal.   Nursing note and vitals reviewed.        Assessment/Plan     Problem List Items Addressed This Visit        Cardiovascular and Mediastinum    Migraine without status migrainosus, not intractable    Relevant Medications    topiramate (TOPAMAX) 100 MG tablet    gabapentin (NEURONTIN) 800 MG tablet       Digestive    B12 deficiency    Relevant Orders    Vitamin B12 & Folate       Nervous and Auditory    Nerve pain    Relevant Medications    gabapentin (NEURONTIN) 800 MG tablet       Musculoskeletal and Integument    Osteoporosis    Overview     Has had compression fracture.         Relevant Medications    vitamin D (ERGOCALCIFEROL) 76863 units capsule capsule    alendronate (FOSAMAX) 70 MG tablet       Other    Chronic pain syndrome - Primary    Relevant Medications    topiramate (TOPAMAX) 100 MG tablet    gabapentin (NEURONTIN) 800 MG tablet    Other Relevant Orders    CBC & Differential    Comprehensive Metabolic Panel    Normal body mass index (BMI)      Other Visit Diagnoses     Vitamin D deficiency        Relevant Medications    vitamin D (ERGOCALCIFEROL) 63958 units capsule capsule    Other Relevant Orders    Vitamin D 25 Hydroxy    Need for hepatitis C screening test        Relevant Orders    Hepatitis C Antibody          · Discussed the patient's BMI with her. BMI is within normal parameters. No follow-up required.  · Checking levels of vitamins, discussed possible toxicity with vitamin D supplementation if taking high dose for long period  · The following records have been requested: colonoscopy, DEXA and mammogram.  · DELMER reviewed and appropriate.   ·     Return in about 3 months (around 7/2/2018) for Gabapentin Refill, ; Please  obtain records..    Eugenie Rivera MD    Please note that portions of this note may have been completed with a voice recognition program. Efforts were made to edit dictation, but occasionally words are mistranscribed.

## 2018-04-03 LAB
25(OH)D3+25(OH)D2 SERPL-MCNC: 77.8 NG/ML
ALBUMIN SERPL-MCNC: 4 G/DL (ref 3.5–5)
ALBUMIN/GLOB SERPL: 1.7 G/DL (ref 1–2)
ALP SERPL-CCNC: 80 U/L (ref 38–126)
ALT SERPL-CCNC: 37 U/L (ref 13–69)
AST SERPL-CCNC: 31 U/L (ref 15–46)
BASOPHILS # BLD AUTO: 0.02 10*3/MM3 (ref 0–0.2)
BASOPHILS NFR BLD AUTO: 0.4 % (ref 0–2.5)
BILIRUB SERPL-MCNC: 0.6 MG/DL (ref 0.2–1.3)
BUN SERPL-MCNC: 9 MG/DL (ref 7–20)
BUN/CREAT SERPL: 11.3 (ref 7.1–23.5)
CALCIUM SERPL-MCNC: 9.6 MG/DL (ref 8.4–10.2)
CHLORIDE SERPL-SCNC: 110 MMOL/L (ref 98–107)
CO2 SERPL-SCNC: 26 MMOL/L (ref 26–30)
CREAT SERPL-MCNC: 0.8 MG/DL (ref 0.6–1.3)
EOSINOPHIL # BLD AUTO: 0.06 10*3/MM3 (ref 0–0.7)
EOSINOPHIL NFR BLD AUTO: 1.3 % (ref 0–7)
ERYTHROCYTE [DISTWIDTH] IN BLOOD BY AUTOMATED COUNT: 12.9 % (ref 11.5–14.5)
FOLATE SERPL-MCNC: 4.13 NG/ML
GFR SERPLBLD CREATININE-BSD FMLA CKD-EPI: 74 ML/MIN/1.73
GFR SERPLBLD CREATININE-BSD FMLA CKD-EPI: 90 ML/MIN/1.73
GLOBULIN SER CALC-MCNC: 2.4 GM/DL
GLUCOSE SERPL-MCNC: 61 MG/DL (ref 74–98)
HCT VFR BLD AUTO: 43 % (ref 37–47)
HCV AB S/CO SERPL IA: <0.1 S/CO RATIO (ref 0–0.9)
HGB BLD-MCNC: 14 G/DL (ref 12–16)
IMM GRANULOCYTES # BLD: 0 10*3/MM3 (ref 0–0.06)
IMM GRANULOCYTES NFR BLD: 0 % (ref 0–0.6)
LYMPHOCYTES # BLD AUTO: 2.37 10*3/MM3 (ref 0.6–3.4)
LYMPHOCYTES NFR BLD AUTO: 51.6 % (ref 10–50)
MCH RBC QN AUTO: 29.9 PG (ref 27–31)
MCHC RBC AUTO-ENTMCNC: 32.6 G/DL (ref 30–37)
MCV RBC AUTO: 91.7 FL (ref 81–99)
MONOCYTES # BLD AUTO: 0.39 10*3/MM3 (ref 0–0.9)
MONOCYTES NFR BLD AUTO: 8.5 % (ref 0–12)
NEUTROPHILS # BLD AUTO: 1.75 10*3/MM3 (ref 2–6.9)
NEUTROPHILS NFR BLD AUTO: 38.2 % (ref 37–80)
NRBC BLD AUTO-RTO: 0 /100 WBC (ref 0–0)
PLATELET # BLD AUTO: 176 10*3/MM3 (ref 130–400)
POTASSIUM SERPL-SCNC: 3.9 MMOL/L (ref 3.5–5.1)
PROT SERPL-MCNC: 6.4 G/DL (ref 6.3–8.2)
RBC # BLD AUTO: 4.69 10*6/MM3 (ref 4.2–5.4)
SODIUM SERPL-SCNC: 149 MMOL/L (ref 137–145)
VIT B12 SERPL-MCNC: 869 PG/ML (ref 239–931)
WBC # BLD AUTO: 4.59 10*3/MM3 (ref 4.8–10.8)

## 2018-05-07 ENCOUNTER — TELEPHONE (OUTPATIENT)
Dept: INTERNAL MEDICINE | Facility: CLINIC | Age: 58
End: 2018-05-07

## 2018-05-07 NOTE — TELEPHONE ENCOUNTER
KASH interactive contact (KASH call) required within 2 business days of hospital discharge.  Discharge notification not received within required time frame.  TCM not applicable.

## 2018-05-07 NOTE — TELEPHONE ENCOUNTER
I HAVE SCHEDULE A HOSPITAL F/U TOMORROW WITH MEDARDO GRAHAM AT 9:15.  PATIENT WAS IN [facility] IN [place] AND RELEASED ON 4/27/2018.

## 2018-05-08 ENCOUNTER — OFFICE VISIT (OUTPATIENT)
Dept: INTERNAL MEDICINE | Facility: CLINIC | Age: 58
End: 2018-05-08

## 2018-05-08 VITALS
HEART RATE: 78 BPM | OXYGEN SATURATION: 100 % | TEMPERATURE: 98.7 F | DIASTOLIC BLOOD PRESSURE: 68 MMHG | HEIGHT: 63 IN | WEIGHT: 125.8 LBS | BODY MASS INDEX: 22.29 KG/M2 | SYSTOLIC BLOOD PRESSURE: 114 MMHG

## 2018-05-08 DIAGNOSIS — I95.1 ORTHOSTATIC HYPOTENSION: ICD-10-CM

## 2018-05-08 DIAGNOSIS — E53.8 VITAMIN B 12 DEFICIENCY: ICD-10-CM

## 2018-05-08 DIAGNOSIS — R40.4 ALTERED AWARENESS, TRANSIENT: Primary | ICD-10-CM

## 2018-05-08 DIAGNOSIS — E87.5 HYPERKALEMIA: ICD-10-CM

## 2018-05-08 DIAGNOSIS — G89.4 CHRONIC PAIN SYNDROME: ICD-10-CM

## 2018-05-08 DIAGNOSIS — G43.909 MIGRAINE WITHOUT STATUS MIGRAINOSUS, NOT INTRACTABLE, UNSPECIFIED MIGRAINE TYPE: ICD-10-CM

## 2018-05-08 PROCEDURE — 99214 OFFICE O/P EST MOD 30 MIN: CPT | Performed by: PHYSICIAN ASSISTANT

## 2018-05-08 RX ORDER — ERGOCALCIFEROL 1.25 MG/1
50000 CAPSULE ORAL
COMMUNITY
Start: 2017-05-25 | End: 2018-10-15

## 2018-05-08 RX ORDER — MIDODRINE HYDROCHLORIDE 5 MG/1
5 TABLET ORAL 3 TIMES DAILY
COMMUNITY
Start: 2018-04-27 | End: 2018-05-22 | Stop reason: SINTOL

## 2018-05-08 RX ORDER — TOPIRAMATE 100 MG/1
100 TABLET, FILM COATED ORAL 2 TIMES DAILY
Qty: 60 TABLET | Refills: 5 | Status: SHIPPED | OUTPATIENT
Start: 2018-05-08 | End: 2018-08-07 | Stop reason: SDUPTHER

## 2018-05-08 NOTE — PROGRESS NOTES
Transitional Care Follow Up Visit  Subjective     Connie May Felty is a 57 y.o. female who presents for a hospital follow up visit.     I reviewed and discussed the details of the discharge summary, hospital problems, inpatient lab results, inpatient diagnostic studies, and consultation reports with Debra.     Current outpatient and discharge medications have been reconciled for the patient.    History of Present Illness   Course During Hospital Stay: Discharged on 4/27.   She was standing talking to her sister when she had a syncopal episode leading to ED visit and hospital admission. She had extensive workup with no findings other than mild orthostatic hypotension.      She reports that she had not had vertigo symptoms in a long while until she began midodrine when vertigo reoccurred. Prior to midodrine the vertigo was contributed to an anti-inflammatory medication which she discontinued at her providers recommendation.  Yesterday her BP was low yesterday at 102/54 while sitting but today is some better. She reports that she feels her blood pressure has been running low for awhile. She followed up with an ARNP yesterday for follow up from admission where she had labs drawn and submitted a stool sample. She is not aware of any lab results yet.            The following portions of the patient's history were reviewed and updated as appropriate: allergies, current medications, past family history, past medical history, past social history, past surgical history and problem list.    Review of Systems   Constitutional: Negative for appetite change, chills, fatigue, fever and unexpected weight change.   HENT: Negative for congestion, ear pain, hearing loss, nosebleeds, postnasal drip, rhinorrhea, sore throat, tinnitus and trouble swallowing.    Eyes: Negative for photophobia, discharge and visual disturbance.   Respiratory: Negative for cough, chest tightness, shortness of breath and wheezing.    Cardiovascular:  Negative for chest pain, palpitations and leg swelling.   Gastrointestinal: Negative for abdominal distention, abdominal pain, blood in stool, constipation, diarrhea, nausea and vomiting.   Endocrine: Negative for cold intolerance, heat intolerance, polydipsia, polyphagia and polyuria.   Genitourinary: Negative.    Musculoskeletal: Negative for arthralgias, back pain, joint swelling, myalgias, neck pain and neck stiffness.        Chronic pain   Skin: Negative for color change, pallor, rash and wound.   Allergic/Immunologic: Negative for environmental allergies, food allergies and immunocompromised state.   Neurological: Positive for syncope (lead to admission), light-headedness (postural) and numbness (neuropathy). Negative for dizziness, tremors, seizures, weakness and headaches.   Hematological: Negative for adenopathy. Does not bruise/bleed easily.   Psychiatric/Behavioral: Negative for agitation, behavioral problems, confusion, hallucinations, self-injury and suicidal ideas. The patient is not nervous/anxious.        Objective   Physical Exam   Constitutional: She is oriented to person, place, and time. She appears well-developed and well-nourished. No distress.   HENT:   Head: Normocephalic and atraumatic.   Mouth/Throat: Oropharynx is clear and moist.   Eyes: EOM are normal. Pupils are equal, round, and reactive to light. No scleral icterus.   Neck: Normal range of motion. Neck supple. No thyromegaly present.   Cardiovascular: Normal rate, regular rhythm and normal heart sounds.  Exam reveals no gallop and no friction rub.    No murmur heard.  Pulmonary/Chest: Effort normal and breath sounds normal. No respiratory distress. She has no wheezes. She has no rales. She exhibits no tenderness.   Abdominal: Soft. Bowel sounds are normal. She exhibits no distension and no mass. There is no tenderness.   Musculoskeletal: Normal range of motion. She exhibits no tenderness.   Lymphadenopathy:     She has no cervical  adenopathy.   Neurological: She is alert and oriented to person, place, and time. She has normal reflexes. She displays normal reflexes. No cranial nerve deficit. She exhibits normal muscle tone. Coordination normal.   Skin: Skin is warm and dry. She is not diaphoretic.   Psychiatric: She has a normal mood and affect. Her behavior is normal. Judgment and thought content normal.   Nursing note and vitals reviewed.      Assessment/Plan   Debra was seen today for follow-up.    Diagnoses and all orders for this visit:    Altered awareness, transient  -     TSH  -     T4  -     Thyroid Peroxidase Antibody    Hyperkalemia  -CBC and CMP reviewed and ok from yesterday (f/u with ARNP scheduled during discharge). No further evidence of hyperkalemia.     Vitamin B 12 deficiency  Had B12 injection during admission, though unable to find any B12 lab result during admission. B12 injections were previously discontinued by PCP due to evidence of adequate B12 level without injections.    Orthostatic hypotension  -     Ambulatory Referral to Cardiology  -     TSH  -     T4  -     Thyroid Peroxidase Antibody  Recommended she decrease midodrine to 2.5 mg tid for the next 3 days due to her concern of contributing to vertigo.     Admission record reviewed.   -Mild mitral and tricuspid insufficiency noted on Echo.  -Mildly positive tilt-table test and she was symptomatic during the test.   -AST and ALT mildly elevated on at least 1 day during admission.   -UA, CXR and CT head all normal.           Side note:  Last EGD and colonoscopy 6/2017. Has been on Fosamax for 10+ years since gastric bypass surgery. Need to consider stopping Fosamax due to risk of esophageal perforation, erosion or stricture. Has been on Carafate qid for the last 3 years or so.

## 2018-05-09 LAB
T4 SERPL-MCNC: 6.1 UG/DL (ref 4.5–12)
THYROPEROXIDASE AB SERPL-ACNC: 12 IU/ML (ref 0–34)
TSH SERPL DL<=0.005 MIU/L-ACNC: 2.22 MIU/ML (ref 0.47–4.68)

## 2018-05-10 ENCOUNTER — TELEPHONE (OUTPATIENT)
Dept: INTERNAL MEDICINE | Facility: CLINIC | Age: 58
End: 2018-05-10

## 2018-05-14 ENCOUNTER — TELEPHONE (OUTPATIENT)
Dept: INTERNAL MEDICINE | Facility: CLINIC | Age: 58
End: 2018-05-14

## 2018-05-14 NOTE — TELEPHONE ENCOUNTER
Pt says neurontin was called into the wrong pharmacy. Says it was called into [place] and was supposed to be called into [facility] in  [place], [place]

## 2018-05-18 NOTE — TELEPHONE ENCOUNTER
Patient called and the pharmacy would not transfer med since it is a narcotic, I called the local [facility] and had it cancelled

## 2018-05-22 ENCOUNTER — TELEPHONE (OUTPATIENT)
Dept: INTERNAL MEDICINE | Facility: CLINIC | Age: 58
End: 2018-05-22

## 2018-05-22 ENCOUNTER — CONSULT (OUTPATIENT)
Dept: CARDIOLOGY | Facility: CLINIC | Age: 58
End: 2018-05-22

## 2018-05-22 VITALS
DIASTOLIC BLOOD PRESSURE: 60 MMHG | OXYGEN SATURATION: 99 % | HEIGHT: 63 IN | RESPIRATION RATE: 16 BRPM | HEART RATE: 70 BPM | WEIGHT: 128 LBS | SYSTOLIC BLOOD PRESSURE: 110 MMHG | BODY MASS INDEX: 22.68 KG/M2

## 2018-05-22 DIAGNOSIS — R55 SYNCOPE, UNSPECIFIED SYNCOPE TYPE: Primary | ICD-10-CM

## 2018-05-22 DIAGNOSIS — R01.1 MURMUR, CARDIAC: ICD-10-CM

## 2018-05-22 DIAGNOSIS — R06.09 DOE (DYSPNEA ON EXERTION): ICD-10-CM

## 2018-05-22 DIAGNOSIS — I20.8 ANGINAL EQUIVALENT (HCC): ICD-10-CM

## 2018-05-22 DIAGNOSIS — I95.1 ORTHOSTATIC HYPOTENSION: ICD-10-CM

## 2018-05-22 DIAGNOSIS — J30.89 ENVIRONMENTAL AND SEASONAL ALLERGIES: ICD-10-CM

## 2018-05-22 DIAGNOSIS — R07.2 PRECORDIAL PAIN: ICD-10-CM

## 2018-05-22 PROCEDURE — 99204 OFFICE O/P NEW MOD 45 MIN: CPT | Performed by: INTERNAL MEDICINE

## 2018-05-22 RX ORDER — FEXOFENADINE HCL 180 MG/1
180 TABLET ORAL DAILY
Qty: 30 TABLET | Refills: 5 | Status: SHIPPED | OUTPATIENT
Start: 2018-05-22 | End: 2019-02-08

## 2018-05-22 RX ORDER — FLUTICASONE PROPIONATE 50 MCG
2 SPRAY, SUSPENSION (ML) NASAL DAILY
Qty: 1 BOTTLE | Refills: 1 | Status: SHIPPED | OUTPATIENT
Start: 2018-05-22 | End: 2020-09-22 | Stop reason: SDUPTHER

## 2018-05-22 NOTE — PROGRESS NOTES
Subjective:     Encounter Date:05/22/2018      Patient ID: Connie May Felty is a 57 y.o. female.    Chief Complaint:Chest pain shortness of breath palpitations and syncope  HPI  This is a 57-year-old female patient who was admitted to the [facility] for chest discomfort and syncope on April 24.  The patient apparently ruled out for myocardial infarction and did not undergo an ischemic evaluation.  She did not have a stress test or a heart catheterization.  The patient indicates that she was told that she had a heart murmur and had a mildly leaking heart valve which are warranted no further therapy and was unrelated to her symptoms.  The patient indicates that she gets a pressure sensation diffusely across her central chest which occasionally radiates into her left shoulder and down her left arm.  This is associated with shortness of breath but no nausea vomiting or diaphoresis.  This generally occurs on a daily basis and often occurs at rest.  The discomfort will occur when she is walking or sometimes just sitting still.  She cannot identify any precipitating aggravating or alleviating features.  She has no personal history of myocardial infarction or coronary revascularization.  She has no history of hypertension diabetes or elevated cholesterol.  She is a nonsmoker.  She has a history of low blood pressure since having gastric bypass surgery.  She lost over 130 pounds with gastric bypass surgery.  She reports shortness of breath both at rest and with activity.  His tends to occur if she's doing a lot of walking or bending over.  Sometimes she reports shortness of breath with activities of daily living or just talking on the telephone.  She reports shortness of breath with stressful situations that is relieved with relaxation.  There is no orthopnea or PND.  She has had intermittent swelling of her feet and ankles over the last several years.  She reports generalized weakness is of fatigue and  lack of energy.  She reports poor stamina for physical activities.  She reports having palpitations which she describes as a sense that her heart is racing and beating irregularly.  This has been occurring for many years.  She has only had the one syncopal episode which prompted hospitalization on April 24 and has not recurred.  She has no history of documented arrhythmia and has never worn an outpatient heart monitor.  She is a nonsmoker.  Her family history is negative for premature coronary disease.  Records from her hospitalization in April of this year in the [facility] have been requested.  The following portions of the patient's history were reviewed and updated as appropriate: allergies, current medications, past family history, past medical history, past social history, past surgical history and problem  Review of Systems   Constitution: Positive for weakness and malaise/fatigue. Negative for chills, diaphoresis, fever, weight gain and weight loss.   HENT: Negative for ear discharge, hearing loss, hoarse voice and nosebleeds.    Eyes: Negative for discharge, double vision, pain and photophobia.   Cardiovascular: Positive for chest pain, dyspnea on exertion, irregular heartbeat, leg swelling, palpitations and syncope. Negative for claudication, cyanosis, near-syncope, orthopnea and paroxysmal nocturnal dyspnea.   Respiratory: Positive for shortness of breath. Negative for cough, hemoptysis, sputum production and wheezing.    Endocrine: Negative for cold intolerance, heat intolerance, polydipsia, polyphagia and polyuria.   Hematologic/Lymphatic: Negative for adenopathy and bleeding problem. Does not bruise/bleed easily.   Skin: Negative for color change, flushing, itching and rash.   Musculoskeletal: Negative for muscle cramps, muscle weakness, myalgias and stiffness.   Gastrointestinal: Negative for abdominal pain, diarrhea, hematemesis, hematochezia, nausea and vomiting.   Genitourinary: Negative for  dysuria, frequency and nocturia.   Neurological: Negative for focal weakness, loss of balance, numbness, paresthesias and seizures.   Psychiatric/Behavioral: Negative for altered mental status, hallucinations and suicidal ideas.   Allergic/Immunologic: Negative for HIV exposure, hives and persistent infections.           Current Outpatient Prescriptions:   •  alendronate (FOSAMAX) 70 MG tablet, Take 1 tablet by mouth 1 (One) Time Per Week., Disp: 4 tablet, Rfl: 5  •  Cyanocobalamin 1000 MCG/ML kit, Inject 1,000 mcg into the shoulder, thigh, or buttocks., Disp: , Rfl:   •  ergocalciferol (ERGOCALCIFEROL) 50488 units capsule, Take 50,000 Units by mouth., Disp: , Rfl:   •  fluticasone (FLONASE) 50 MCG/ACT nasal spray, 2 sprays into each nostril Daily., Disp: 1 bottle, Rfl: 1  •  gabapentin (NEURONTIN) 800 MG tablet, Take 1 tablet by mouth 4 (Four) Times a Day., Disp: 120 tablet, Rfl: 2  •  linaclotide (LINZESS) 145 MCG capsule capsule, Take 145 mcg by mouth Daily., Disp: , Rfl:   •  RA ALLERGY RELIEF 180 MG tablet, 1 tablet. As needed, Disp: , Rfl: 0  •  sucralfate (CARAFATE) 1 g tablet, TAKE 1 TABLET 4 TIMES DAILY, BEFORE MEALS AND AT BEDTIME, Disp: 100 tablet, Rfl: 1  •  topiramate (TOPAMAX) 100 MG tablet, Take 1 tablet by mouth 2 (Two) Times a Day., Disp: 60 tablet, Rfl: 5     Objective:     Physical Exam   Constitutional: She is oriented to person, place, and time. She appears well-developed and well-nourished.   HENT:   Head: Normocephalic and atraumatic.   Mouth/Throat: Oropharynx is clear and moist.   Eyes: Conjunctivae and EOM are normal. Pupils are equal, round, and reactive to light. No scleral icterus.   Neck: Normal range of motion. Neck supple. No JVD present. No tracheal deviation present. No thyromegaly present.   Cardiovascular: Normal rate, regular rhythm, S1 normal, S2 normal, normal heart sounds, intact distal pulses and normal pulses.  PMI is not displaced.  Exam reveals no gallop and no friction  "rub.    No murmur heard.  Pulmonary/Chest: Effort normal and breath sounds normal. No respiratory distress. She has no wheezes. She has no rales.   Abdominal: Soft. Bowel sounds are normal. She exhibits no distension and no mass. There is no tenderness. There is no rebound and no guarding.   Musculoskeletal: Normal range of motion. She exhibits no edema or deformity.   Neurological: She is alert and oriented to person, place, and time. She displays normal reflexes. No cranial nerve deficit. Coordination normal.   Skin: Skin is warm and dry. No rash noted. No erythema.   Psychiatric: She has a normal mood and affect. Her behavior is normal. Thought content normal.     Blood pressure 110/60, pulse 70, resp. rate 16, height 160 cm (62.99\"), weight 58.1 kg (128 lb), SpO2 99 %.   Lab Review:       Assessment:         1. Syncope, unspecified syncope type  This appears to be an associated symptom related to onset of chest discomfort.  - Stress Test With Myocardial Perfusion Two Day  - Adult Transthoracic Echo Complete W/ Cont if Necessary Per Protocol  - Mobile Cardiac Outpatient Telemetry  - Tilt Table    2. Precordial pain  The patient's chest discomfort has features both typical and atypical for coronary insufficiency.  The patient has never had an ischemic evaluation.  The patient has multiple risk factors for coronary artery disease.  The patient is unable to do treadmill exercise stress testing due to  shortness of breath with activity and poor effort tolerance.  - Stress Test With Myocardial Perfusion Two Day  - Adult Transthoracic Echo Complete W/ Cont if Necessary Per Protocol    3. PARKINSON (dyspnea on exertion)  This is multifactorial in etiology.  Some is certainly related to the patient's  aerobic deconditioning.   There may be an element of unrecognized congestive heart failure.  This could also represent an angina equivalent.  - Stress Test With Myocardial Perfusion Two Day  - Adult Transthoracic Echo Complete W/ " Cont if Necessary Per Protocol    4. Anginal equivalent  Shortness of breath with activity that is relieved with rest is consistent with the diagnosis of angina pectoris.  - Stress Test With Myocardial Perfusion Two Day  - Adult Transthoracic Echo Complete W/ Cont if Necessary Per Protocol    5. Orthostatic hypotension  The patient has a long-standing history of low blood pressure going back to her gastric bypass.  This is a chronic problem which has been asymptomatic of late.  She did not Tolerate a trial of medical therapy due to the development of vertigo.  - Adult Transthoracic Echo Complete W/ Cont if Necessary Per Protocol  - Tilt Table    6. Murmur, cardiac  I did not appreciate a murmur during auscultation of her heart at today's cardiac evaluation.  - Adult Transthoracic Echo Complete W/ Cont if Necessary Per Protocol    Procedures     Plan:       I have recommended a vasodilator nuclear stress test as well as an echocardiogram.  I have recommended a 30 day Mobile cardiac outpatient telemetry monitor.  I have recommended a head upright tilt table test.  We have requested her records from [facility] during her hospitalization in April of this year.  No changes in her medication therapy have been made at today's visit.  Further recommendations will be predicated on the results of her outpatient testing.

## 2018-05-31 LAB
BH CV ECHO MEAS - % IVS THICK: 53.3 %
BH CV ECHO MEAS - % LVPW THICK: 53.8 %
BH CV ECHO MEAS - AO MAX PG (FULL): 1.6 MMHG
BH CV ECHO MEAS - AO MAX PG: 4 MMHG
BH CV ECHO MEAS - AO MEAN PG (FULL): 1 MMHG
BH CV ECHO MEAS - AO MEAN PG: 2 MMHG
BH CV ECHO MEAS - AO ROOT AREA: 8 CM^2
BH CV ECHO MEAS - AO ROOT DIAM: 3.2 CM
BH CV ECHO MEAS - AO V2 MAX: 100.6 CM/SEC
BH CV ECHO MEAS - AO V2 MEAN: 66.4 CM/SEC
BH CV ECHO MEAS - AO V2 VTI: 18.2 CM
BH CV ECHO MEAS - AVA(I,A): 2.8 CM^2
BH CV ECHO MEAS - AVA(I,D): 2.8 CM^2
BH CV ECHO MEAS - AVA(V,A): 2.9 CM^2
BH CV ECHO MEAS - AVA(V,D): 2.9 CM^2
BH CV ECHO MEAS - CONTRAST EF 4CH: 57.1 ML/M^2
BH CV ECHO MEAS - EDV(CUBED): 140.6 ML
BH CV ECHO MEAS - EDV(MOD-SP4): 77 ML
BH CV ECHO MEAS - EDV(TEICH): 129.5 ML
BH CV ECHO MEAS - EF(CUBED): 73.3 %
BH CV ECHO MEAS - EF(MOD-SP4): 57.1 %
BH CV ECHO MEAS - EF(TEICH): 64.7 %
BH CV ECHO MEAS - ESV(CUBED): 37.6 ML
BH CV ECHO MEAS - ESV(MOD-SP4): 33 ML
BH CV ECHO MEAS - ESV(TEICH): 45.8 ML
BH CV ECHO MEAS - FS: 35.6 %
BH CV ECHO MEAS - IVS/LVPW: 1.2
BH CV ECHO MEAS - IVSD: 0.75 CM
BH CV ECHO MEAS - IVSS: 1.2 CM
BH CV ECHO MEAS - LA DIMENSION: 3.3 CM
BH CV ECHO MEAS - LA/AO: 1
BH CV ECHO MEAS - LV IVRT: 0.15 SEC
BH CV ECHO MEAS - LV MASS(C)D: 122.8 GRAMS
BH CV ECHO MEAS - LV MASS(C)S: 107.7 GRAMS
BH CV ECHO MEAS - LV MAX PG: 2.4 MMHG
BH CV ECHO MEAS - LV MEAN PG: 1 MMHG
BH CV ECHO MEAS - LV V1 MAX: 76.8 CM/SEC
BH CV ECHO MEAS - LV V1 MEAN: 47.8 CM/SEC
BH CV ECHO MEAS - LV V1 VTI: 13.2 CM
BH CV ECHO MEAS - LVIDD: 5.2 CM
BH CV ECHO MEAS - LVIDS: 3.4 CM
BH CV ECHO MEAS - LVLD AP4: 7.4 CM
BH CV ECHO MEAS - LVLS AP4: 6.2 CM
BH CV ECHO MEAS - LVOT AREA (M): 3.8 CM^2
BH CV ECHO MEAS - LVOT AREA: 3.8 CM^2
BH CV ECHO MEAS - LVOT DIAM: 2.2 CM
BH CV ECHO MEAS - LVPWD: 0.65 CM
BH CV ECHO MEAS - LVPWS: 1 CM
BH CV ECHO MEAS - MV A MAX VEL: 81.8 CM/SEC
BH CV ECHO MEAS - MV DEC SLOPE: 383.5 CM/SEC^2
BH CV ECHO MEAS - MV DEC TIME: 0.19 SEC
BH CV ECHO MEAS - MV E MAX VEL: 66.7 CM/SEC
BH CV ECHO MEAS - MV E/A: 0.82
BH CV ECHO MEAS - MV P1/2T MAX VEL: 67.4 CM/SEC
BH CV ECHO MEAS - MV P1/2T: 51.5 MSEC
BH CV ECHO MEAS - MVA P1/2T LCG: 3.3 CM^2
BH CV ECHO MEAS - MVA(P1/2T): 4.3 CM^2
BH CV ECHO MEAS - PA MAX PG: 2.1 MMHG
BH CV ECHO MEAS - PA V2 MAX: 72.9 CM/SEC
BH CV ECHO MEAS - RAP SYSTOLE: 10 MMHG
BH CV ECHO MEAS - RVSP: 22 MMHG
BH CV ECHO MEAS - SV(AO): 146.4 ML
BH CV ECHO MEAS - SV(CUBED): 103 ML
BH CV ECHO MEAS - SV(LVOT): 50.2 ML
BH CV ECHO MEAS - SV(MOD-SP4): 44 ML
BH CV ECHO MEAS - SV(TEICH): 83.7 ML
BH CV ECHO MEAS - TR MAX VEL: 171.3 CM/SEC
BH CV ECHO MEAS - TV MAX PG: 1.1 MMHG
BH CV ECHO MEAS - TV V2 MAX: 52.3 CM/SEC
LV EF 2D ECHO EST: 64 %

## 2018-06-13 ENCOUNTER — HOSPITAL ENCOUNTER (OUTPATIENT)
Dept: NUCLEAR MEDICINE | Facility: HOSPITAL | Age: 58
Discharge: HOME | End: 2018-06-13
Attending: INTERNAL MEDICINE

## 2018-06-13 PROCEDURE — 78452 HT MUSCLE IMAGE SPECT MULT: CPT | Performed by: INTERNAL MEDICINE

## 2018-06-13 PROCEDURE — 0 TECHNETIUM SESTAMIBI: Performed by: INTERNAL MEDICINE

## 2018-06-13 PROCEDURE — 25010000002 REGADENOSON 0.4 MG/5ML SOLUTION: Performed by: INTERNAL MEDICINE

## 2018-06-13 PROCEDURE — 93018 CV STRESS TEST I&R ONLY: CPT | Performed by: INTERNAL MEDICINE

## 2018-06-13 PROCEDURE — 78452 HT MUSCLE IMAGE SPECT MULT: CPT

## 2018-06-13 PROCEDURE — A9500 TC99M SESTAMIBI: HCPCS | Performed by: INTERNAL MEDICINE

## 2018-06-13 PROCEDURE — 93017 CV STRESS TEST TRACING ONLY: CPT

## 2018-06-13 RX ADMIN — REGADENOSON 0.4 MG: 0.08 INJECTION, SOLUTION INTRAVENOUS at 09:25

## 2018-06-13 RX ADMIN — TECHNETIUM TC 99M SESTAMIBI 1 DOSE: 1 INJECTION INTRAVENOUS at 09:25

## 2018-06-14 ENCOUNTER — HOSPITAL ENCOUNTER (OUTPATIENT)
Dept: NUCLEAR MEDICINE | Facility: HOSPITAL | Age: 58
Discharge: HOME | End: 2018-06-14
Attending: INTERNAL MEDICINE

## 2018-06-14 LAB
BH CV NUCLEAR PRIOR STUDY: 3
BH CV STRESS COMMENTS STAGE 1: NORMAL
BH CV STRESS DOSE REGADENOSON STAGE 1: 0.4
BH CV STRESS DURATION MIN STAGE 1: 0
BH CV STRESS DURATION SEC STAGE 1: 10
BH CV STRESS PROTOCOL 1: NORMAL
BH CV STRESS RECOVERY BP: NORMAL MMHG
BH CV STRESS RECOVERY HR: 72 BPM
BH CV STRESS STAGE 1: 1
LV EF NUC BP: 64 %
MAXIMAL PREDICTED HEART RATE: 164 BPM
PERCENT MAX PREDICTED HR: 59.76 %
STRESS BASELINE BP: NORMAL MMHG
STRESS BASELINE HR: 60 BPM
STRESS PERCENT HR: 70 %
STRESS POST PEAK BP: NORMAL MMHG
STRESS POST PEAK HR: 98 BPM
STRESS TARGET HR: 139 BPM

## 2018-06-14 PROCEDURE — 0 TECHNETIUM SESTAMIBI: Performed by: INTERNAL MEDICINE

## 2018-06-14 PROCEDURE — A9500 TC99M SESTAMIBI: HCPCS | Performed by: INTERNAL MEDICINE

## 2018-06-14 RX ADMIN — TECHNETIUM TC 99M SESTAMIBI 1 DOSE: 1 INJECTION INTRAVENOUS at 08:05

## 2018-07-06 LAB
Lab: 2
TOAL ENROLLMENT DAYS: 36

## 2018-07-17 ENCOUNTER — TELEPHONE (OUTPATIENT)
Dept: INTERNAL MEDICINE | Facility: CLINIC | Age: 58
End: 2018-07-17

## 2018-08-07 ENCOUNTER — OFFICE VISIT (OUTPATIENT)
Dept: INTERNAL MEDICINE | Facility: CLINIC | Age: 58
End: 2018-08-07

## 2018-08-07 ENCOUNTER — OFFICE VISIT (OUTPATIENT)
Dept: CARDIOLOGY | Facility: CLINIC | Age: 58
End: 2018-08-07

## 2018-08-07 VITALS
BODY MASS INDEX: 22.52 KG/M2 | DIASTOLIC BLOOD PRESSURE: 70 MMHG | SYSTOLIC BLOOD PRESSURE: 120 MMHG | WEIGHT: 122.38 LBS | OXYGEN SATURATION: 98 % | HEIGHT: 62 IN | TEMPERATURE: 98.6 F | HEART RATE: 73 BPM

## 2018-08-07 VITALS
RESPIRATION RATE: 16 BRPM | DIASTOLIC BLOOD PRESSURE: 64 MMHG | OXYGEN SATURATION: 98 % | WEIGHT: 122.2 LBS | HEART RATE: 70 BPM | BODY MASS INDEX: 22.49 KG/M2 | SYSTOLIC BLOOD PRESSURE: 102 MMHG | HEIGHT: 62 IN

## 2018-08-07 DIAGNOSIS — R55 SYNCOPE, UNSPECIFIED SYNCOPE TYPE: Primary | ICD-10-CM

## 2018-08-07 DIAGNOSIS — G43.909 MIGRAINE WITHOUT STATUS MIGRAINOSUS, NOT INTRACTABLE, UNSPECIFIED MIGRAINE TYPE: ICD-10-CM

## 2018-08-07 DIAGNOSIS — I20.8 ANGINAL EQUIVALENT (HCC): ICD-10-CM

## 2018-08-07 DIAGNOSIS — G89.4 CHRONIC PAIN SYNDROME: ICD-10-CM

## 2018-08-07 DIAGNOSIS — M79.2 NERVE PAIN: ICD-10-CM

## 2018-08-07 DIAGNOSIS — E53.8 VITAMIN B 12 DEFICIENCY: Primary | ICD-10-CM

## 2018-08-07 DIAGNOSIS — R01.1 MURMUR, CARDIAC: ICD-10-CM

## 2018-08-07 PROBLEM — M54.12 CERVICAL RADICULOPATHY: Status: ACTIVE | Noted: 2018-02-05

## 2018-08-07 PROBLEM — G47.00 INSOMNIA: Status: ACTIVE | Noted: 2018-02-05

## 2018-08-07 PROBLEM — M25.512 PAIN IN JOINT OF LEFT SHOULDER: Status: ACTIVE | Noted: 2018-02-12

## 2018-08-07 PROCEDURE — 99213 OFFICE O/P EST LOW 20 MIN: CPT | Performed by: FAMILY MEDICINE

## 2018-08-07 PROCEDURE — 99213 OFFICE O/P EST LOW 20 MIN: CPT | Performed by: INTERNAL MEDICINE

## 2018-08-07 PROCEDURE — 96372 THER/PROPH/DIAG INJ SC/IM: CPT | Performed by: FAMILY MEDICINE

## 2018-08-07 RX ORDER — ERGOCALCIFEROL 1.25 MG/1
50000 CAPSULE ORAL WEEKLY
Qty: 12 CAPSULE | Refills: 1 | Status: CANCELLED | OUTPATIENT
Start: 2018-08-07

## 2018-08-07 RX ORDER — TOPIRAMATE 100 MG/1
100 TABLET, FILM COATED ORAL 2 TIMES DAILY
Qty: 60 TABLET | Refills: 5 | Status: SHIPPED | OUTPATIENT
Start: 2018-08-07 | End: 2019-01-23 | Stop reason: SDUPTHER

## 2018-08-07 RX ORDER — ACETAMINOPHEN 160 MG
2000 TABLET,DISINTEGRATING ORAL DAILY
Qty: 90 CAPSULE | Refills: 3 | Status: SHIPPED | OUTPATIENT
Start: 2018-08-07 | End: 2019-01-23 | Stop reason: SDUPTHER

## 2018-08-07 RX ORDER — CYANOCOBALAMIN 1000 UG/ML
1000 INJECTION, SOLUTION INTRAMUSCULAR; SUBCUTANEOUS
Status: DISCONTINUED | OUTPATIENT
Start: 2018-08-07 | End: 2019-02-17 | Stop reason: HOSPADM

## 2018-08-07 RX ORDER — GABAPENTIN 800 MG/1
800 TABLET ORAL 4 TIMES DAILY
Qty: 120 TABLET | Refills: 2 | Status: SHIPPED | OUTPATIENT
Start: 2018-08-07 | End: 2018-12-12 | Stop reason: SDUPTHER

## 2018-08-07 RX ADMIN — CYANOCOBALAMIN 1000 MCG: 1000 INJECTION, SOLUTION INTRAMUSCULAR; SUBCUTANEOUS at 16:00

## 2018-08-07 NOTE — PROGRESS NOTES
"Subjective    Connie May Felty is a 57 y.o. female here for:  Chief Complaint   Patient presents with   • Follow-up     3 month follow up for chronic pain syndrome     History of Present Illness     Overall is not feeling bad. Needs refills on medicines. Less pain in back and neck now since she's had injections with pain management. Has cut back some on gabapentin use but still uses daily. Needs Topamax refilled as well as vitamin D. She also is interested in vitamin B12 shots, giving herself, she feels she can do that on her own.    The following portions of the patient's history were reviewed and updated as appropriate: allergies, current medications, past family history, past medical history, past social history, past surgical history and problem list.    Review of Systems   Constitutional: Negative for activity change.   Musculoskeletal: Positive for arthralgias.       Vitals:    08/07/18 1116   BP: 120/70   Pulse: 73   Temp: 98.6 °F (37 °C)   SpO2: 98%   Weight: 55.5 kg (122 lb 6 oz)   Height: 157.5 cm (62.01\")         Objective   Physical Exam   Constitutional: She is oriented to person, place, and time. Vital signs are normal. She appears well-developed and well-nourished. She is active. She does not appear ill. No distress.   HENT:   Head: Normocephalic and atraumatic.   Right Ear: Hearing normal.   Left Ear: Hearing normal.   Mouth/Throat: Mucous membranes are not dry.   Eyes: EOM are normal. No scleral icterus.   Neck: Neck supple.   Cardiovascular: Normal rate and regular rhythm.    Pulmonary/Chest: Effort normal and breath sounds normal.   Neurological: She is alert and oriented to person, place, and time. No cranial nerve deficit.   Skin: Skin is warm. She is not diaphoretic.   Psychiatric: She has a normal mood and affect. Her behavior is normal.   Nursing note and vitals reviewed.        Assessment/Plan     Problem List Items Addressed This Visit        Cardiovascular and Mediastinum    Migraine " without status migrainosus, not intractable    Relevant Medications    topiramate (TOPAMAX) 100 MG tablet    gabapentin (NEURONTIN) 800 MG tablet       Nervous and Auditory    Nerve pain    Relevant Medications    gabapentin (NEURONTIN) 800 MG tablet       Other    Chronic pain syndrome    Relevant Medications    topiramate (TOPAMAX) 100 MG tablet    gabapentin (NEURONTIN) 800 MG tablet          CONTROLLED SUBSTANCE TRACKING 4/2/2018 8/7/2018   Last [place] 4/2/2018 8/7/2018   Report Number [id number]   Last UDS 10/20/2017 10/20/2017   Last Controlled Substance Agreement 10/20/2017 10/20/2017       · DELMER reviewed and appropriate.   ·     Return in about 6 months (around 2/7/2019) for Follow up on current issues.    Eugenie Rivera MD    Please note that portions of this note may have been completed with a voice recognition program. Efforts were made to edit dictation, but occasionally words are mistranscribed.

## 2018-08-07 NOTE — PROGRESS NOTES
Subjective:     Encounter Date:08/07/2018      Patient ID: Connie May Felty is a 57 y.o. female.    Chief Complaint:Edema  HPI  This is a 57-year-old female patient who underwent a battery of outpatient testing to evaluate near-syncope, shortness of breath and peripheral edema.  There was also some concern the patient may have a heart murmur.  The patient underwent a vasodilator nuclear stress test which showed no evidence of ischemia or infarction.  The calculated ejection fraction was normal.  The patient also underwent an echocardiogram which was normal. The echocardiogram showed no evidence of ventricular hypertrophy or cardiac chamber enlargement.  Left ventricular systolic and diastolic function was normal.  There was no evidence of valvular pathology of significance, pericardial disease, pulmonary hypertension or intracardiac shunting.  The left ventricular ejection fraction was normal and there were no regional wall motion abnormalities.  The patient wear a 30 day cardiac monitor which was normal.  There was no arrhythmia or ectopy.  The patient shortness of breath has resolved.  She has had no further syncope.  She has no dizziness palpitations.  She denies having chest discomfort at rest or with activity.  There is no exertional chest arm neck jaw shoulder or back discomfort.  There is no orthopnea or PND.  She reports occasional swelling of her feet and ankles if she's been on her feet for prolonged periods of time.    The following portions of the patient's history were reviewed and updated as appropriate: allergies, current medications, past family history, past medical history, past social history, past surgical history and problem  Review of Systems   Constitution: Negative for chills, diaphoresis, fever, weakness, malaise/fatigue, weight gain and weight loss.   HENT: Negative for ear discharge, hearing loss, hoarse voice and nosebleeds.    Eyes: Negative for discharge, double vision, pain and  photophobia.   Cardiovascular: Positive for leg swelling. Negative for chest pain, claudication, cyanosis, dyspnea on exertion, irregular heartbeat, near-syncope, orthopnea, palpitations, paroxysmal nocturnal dyspnea and syncope.   Respiratory: Negative for cough, hemoptysis, shortness of breath, sputum production and wheezing.    Endocrine: Negative for cold intolerance, heat intolerance, polydipsia, polyphagia and polyuria.   Hematologic/Lymphatic: Negative for adenopathy and bleeding problem. Does not bruise/bleed easily.   Skin: Negative for color change, flushing, itching and rash.   Musculoskeletal: Negative for muscle cramps, muscle weakness, myalgias and stiffness.   Gastrointestinal: Negative for abdominal pain, diarrhea, hematemesis, hematochezia, nausea and vomiting.   Genitourinary: Negative for dysuria, frequency and nocturia.   Neurological: Negative for focal weakness, loss of balance, numbness, paresthesias and seizures.   Psychiatric/Behavioral: Negative for altered mental status, hallucinations and suicidal ideas.   Allergic/Immunologic: Negative for HIV exposure, hives and persistent infections.           Current Outpatient Prescriptions:   •  alendronate (FOSAMAX) 70 MG tablet, Take 1 tablet by mouth 1 (One) Time Per Week., Disp: 4 tablet, Rfl: 5  •  Cholecalciferol (VITAMIN D3) 2000 units capsule, Take 1 capsule by mouth Daily., Disp: 90 capsule, Rfl: 3  •  Cyanocobalamin 1000 MCG/ML kit, Inject 1,000 mcg into the appropriate muscle as directed by prescriber Every 30 (Thirty) Days., Disp: 3 each, Rfl: 3  •  ergocalciferol (ERGOCALCIFEROL) 79803 units capsule, Take 50,000 Units by mouth., Disp: , Rfl:   •  fexofenadine (RA ALLERGY RELIEF) 180 MG tablet, Take 1 tablet by mouth Daily. As needed, Disp: 30 tablet, Rfl: 5  •  fluticasone (FLONASE) 50 MCG/ACT nasal spray, 2 sprays into each nostril Daily., Disp: 1 bottle, Rfl: 1  •  gabapentin (NEURONTIN) 800 MG tablet, Take 1 tablet by mouth 4 (Four)  "Times a Day., Disp: 120 tablet, Rfl: 2  •  linaclotide (LINZESS) 145 MCG capsule capsule, Take 145 mcg by mouth Daily., Disp: , Rfl:   •  sucralfate (CARAFATE) 1 g tablet, TAKE 1 TABLET 4 TIMES DAILY, BEFORE MEALS AND AT BEDTIME, Disp: 100 tablet, Rfl: 1  •  Syringe, Disposable, 1 ML misc, Use to inject vitamin B12 every 28 days, Disp: 25 each, Rfl: 0  •  topiramate (TOPAMAX) 100 MG tablet, Take 1 tablet by mouth 2 (Two) Times a Day., Disp: 60 tablet, Rfl: 5     Objective:     Physical Exam   Constitutional: She is oriented to person, place, and time. She appears well-developed and well-nourished.   HENT:   Head: Normocephalic and atraumatic.   Mouth/Throat: Oropharynx is clear and moist.   Eyes: Pupils are equal, round, and reactive to light. Conjunctivae and EOM are normal. No scleral icterus.   Neck: Normal range of motion. Neck supple. No JVD present. No tracheal deviation present. No thyromegaly present.   Cardiovascular: Normal rate, regular rhythm, S1 normal, S2 normal, normal heart sounds, intact distal pulses and normal pulses.  PMI is not displaced.  Exam reveals no gallop and no friction rub.    No murmur heard.  Pulmonary/Chest: Effort normal and breath sounds normal. No respiratory distress. She has no wheezes. She has no rales.   Abdominal: Soft. Bowel sounds are normal. She exhibits no distension and no mass. There is no tenderness. There is no rebound and no guarding.   Musculoskeletal: Normal range of motion. She exhibits no edema or deformity.   Neurological: She is alert and oriented to person, place, and time. She displays normal reflexes. No cranial nerve deficit. Coordination normal.   Skin: Skin is warm and dry. No rash noted. No erythema.   Psychiatric: She has a normal mood and affect. Her behavior is normal. Thought content normal.     Blood pressure 102/64, pulse 70, resp. rate 16, height 157.5 cm (62.01\"), weight 55.4 kg (122 lb 3.2 oz), SpO2 98 %.   Lab Review:       Assessment:     "     1. Syncope, unspecified syncope type  Negative cardiac evaluation.  No recurrence.  The patient has a long-standing history of marginal to low blood pressure.  Her blood pressure has stabilized of late.    2. Murmur, cardiac  I have never appreciated a murmur at auscultation of the heart.  Her echocardiogram shows no evidence of valvular heart disease or intracardiac shunting.    3. Anginal equivalent (CMS/HCC)  Her shortness of breath has resolved spontaneously.  Nuclear stress testing shows no evidence of myocardial ischemia.    Procedures     Plan:       No changes in her medication profile have been made at today's visit.  No additional cardiovascular testing is indicated.  The patient has been reassured regarding the benign nature of her heart test results.  He is encouraged to maintain an active lifestyle.

## 2018-10-15 ENCOUNTER — OFFICE VISIT (OUTPATIENT)
Dept: INTERNAL MEDICINE | Facility: CLINIC | Age: 58
End: 2018-10-15

## 2018-10-15 VITALS
WEIGHT: 125 LBS | BODY MASS INDEX: 23 KG/M2 | DIASTOLIC BLOOD PRESSURE: 82 MMHG | TEMPERATURE: 98.4 F | SYSTOLIC BLOOD PRESSURE: 120 MMHG | HEIGHT: 62 IN | OXYGEN SATURATION: 98 % | HEART RATE: 72 BPM

## 2018-10-15 DIAGNOSIS — Z98.84 S/P GASTRIC BYPASS: Chronic | ICD-10-CM

## 2018-10-15 DIAGNOSIS — Z78.0 POSTMENOPAUSAL: ICD-10-CM

## 2018-10-15 DIAGNOSIS — M80.80XD OTHER OSTEOPOROSIS WITH CURRENT PATHOLOGICAL FRACTURE WITH ROUTINE HEALING, SUBSEQUENT ENCOUNTER: ICD-10-CM

## 2018-10-15 DIAGNOSIS — S92.351D CLOSED FRACTURE OF FIFTH METATARSAL BONE OF RIGHT FOOT WITH ROUTINE HEALING, PHYSEAL INVOLVEMENT UNSPECIFIED, SUBSEQUENT ENCOUNTER: Primary | ICD-10-CM

## 2018-10-15 PROCEDURE — 99214 OFFICE O/P EST MOD 30 MIN: CPT | Performed by: PHYSICIAN ASSISTANT

## 2018-10-15 RX ORDER — SUCRALFATE 1 G/1
TABLET ORAL
Qty: 100 TABLET | Refills: 1 | Status: SHIPPED | OUTPATIENT
Start: 2018-10-15 | End: 2020-06-22

## 2018-10-15 NOTE — PROGRESS NOTES
Connie May Felty is a 58 y.o. female.     Subjective   History of Present Illness   Here today for follow up from an OSH ER visit yesterday where she was diagnosed with spiral fracture of the right 5th metatarsal. She reports that the the morning of the ER visit while at [facility] she was walking and it suddenly felt like her shoe buckled beneath her foot though when she looked nothing had happened.  There was not much pain at first but then pain developed after she took her shoe off a few hours later.  In the ER she was braced and told to follow up with her PCP and an orthopedic. She has an orthopedic whom she will follow up with tomorrow at 8:00.  She is known to have osteoporosis likely secondary to nutritional deficiencies following gastric bypass surgery. Stopped fosamax after 10 years of use a few months ago at my request. Fosamax poses increased risk of esophageal perforation, erosion or stricture being s/p gastric bypass. She is taking vitamin D 2,000 IU daily but has not yet started a calcium supplement.           The following portions of the patient's history were reviewed and updated as appropriate: allergies, current medications, past family history, past medical history, past social history, past surgical history and problem list.    Review of Systems    Constitutional: Negative for appetite change, chills, fatigue, fever and unexpected weight change.   HENT: Negative for congestion, ear pain, hearing loss, nosebleeds, postnasal drip, rhinorrhea, sore throat, tinnitus and trouble swallowing.    Eyes: Negative for photophobia, discharge and visual disturbance.   Respiratory: Negative for cough, chest tightness, shortness of breath and wheezing.    Cardiovascular: Negative for chest pain, palpitations and leg swelling.   Gastrointestinal: Negative for abdominal distention, abdominal pain, blood in stool, constipation, diarrhea, nausea and vomiting.   Endocrine: Negative for cold intolerance, heat  "intolerance, polydipsia, polyphagia and polyuria.   Musculoskeletal: right foot pain. Negative for back pain, joint swelling, myalgias, neck pain and neck stiffness.   Skin: Negative for color change, pallor, rash and wound.    Allergic/Immunologic: Negative for environmental allergies, food allergies and immunocompromised state.   Neurological: Negative for dizziness, tremors, seizures, weakness, numbness and headaches.   Hematological: Negative for adenopathy. Does not bruise/bleed easily.   Psychiatric/Behavioral: Negative for sleep disturbances, agitation, behavioral problems, confusion, hallucinations, self-injury and suicidal ideas. The patient is not nervous/anxious.      Objective    Physical Exam  Constitutional:  Appears well-developed and well-nourished.   Cardiovascular: Normal rate, regular rhythm and normal heart sounds.    Pulmonary/Chest: Effort normal and breath sounds normal. No respiratory distress.  Has no wheezes or rales. Exhibits no chest wall tenderness.   Abdominal: Soft. Bowel sounds are normal. Exhibits no distension and no mass. There is no tenderness.   Musculoskeletal: right foot in temporary cast from ER. Unable to palpate. Ambulating with a walker to minimize weight bearing.   Neurological: Alert and oriented to person, place, and time.   Skin: Skin is warm and dry.   Psychiatric: Has a normal mood and affect. Behavior is normal. Judgment and thought content normal.       /82   Pulse 72   Temp 98.4 °F (36.9 °C)   Ht 157.5 cm (62.01\")   Wt 56.7 kg (125 lb)   SpO2 98%   BMI 22.86 kg/m²     Nursing note and vitals reviewed.        Assessment/Plan   Debra was seen today for follow-up.    Diagnoses and all orders for this visit:    Closed fracture of fifth metatarsal bone of right foot with routine healing, physeal involvement unspecified, subsequent encounter  -     DEXA Bone Density Axial  Care-everywhere reviewed and OSH ED note revealed spiral fracture of the 5th " metatarsal.  She will follow up with her orthopedic tomorrow at 8:00. Encouraged continued avoidance of weight bearing.       Other osteoporosis with current pathological fracture with routine healing, subsequent encounter  S/P gastric bypass  Postmenopausal  -     DEXA Bone Density Axial  Continue vitamin d and begin calcium supplement daily.       May need to consider alternate osteoporosis treatment based on findings from DEXA.  Prolia or Forteo to be considered.

## 2018-10-24 ENCOUNTER — APPOINTMENT (OUTPATIENT)
Dept: BONE DENSITY | Facility: HOSPITAL | Age: 58
End: 2018-10-24

## 2018-10-24 PROCEDURE — 77080 DXA BONE DENSITY AXIAL: CPT

## 2018-10-25 ENCOUNTER — OFFICE VISIT (OUTPATIENT)
Dept: INTERNAL MEDICINE | Facility: CLINIC | Age: 58
End: 2018-10-25

## 2018-10-25 VITALS
SYSTOLIC BLOOD PRESSURE: 120 MMHG | HEART RATE: 85 BPM | HEIGHT: 62 IN | TEMPERATURE: 98.6 F | OXYGEN SATURATION: 98 % | DIASTOLIC BLOOD PRESSURE: 80 MMHG

## 2018-10-25 DIAGNOSIS — M80.00XD OSTEOPOROSIS WITH CURRENT PATHOLOGICAL FRACTURE WITH ROUTINE HEALING, UNSPECIFIED OSTEOPOROSIS TYPE, SUBSEQUENT ENCOUNTER: Primary | ICD-10-CM

## 2018-10-25 DIAGNOSIS — S92.351D CLOSED FRACTURE OF FIFTH METATARSAL BONE OF RIGHT FOOT WITH ROUTINE HEALING, PHYSEAL INVOLVEMENT UNSPECIFIED, SUBSEQUENT ENCOUNTER: ICD-10-CM

## 2018-10-25 PROCEDURE — 99214 OFFICE O/P EST MOD 30 MIN: CPT | Performed by: FAMILY MEDICINE

## 2018-11-07 ENCOUNTER — TELEPHONE (OUTPATIENT)
Dept: INTERNAL MEDICINE | Facility: CLINIC | Age: 58
End: 2018-11-07

## 2018-11-16 ENCOUNTER — TELEPHONE (OUTPATIENT)
Dept: INTERNAL MEDICINE | Facility: CLINIC | Age: 58
End: 2018-11-16

## 2018-11-16 NOTE — TELEPHONE ENCOUNTER
Sonia from [facility] pharmacy called requesting chart notes be faxed to [phone]

## 2018-11-20 ENCOUNTER — TELEPHONE (OUTPATIENT)
Dept: INTERNAL MEDICINE | Facility: CLINIC | Age: 58
End: 2018-11-20

## 2018-11-20 RX ORDER — PEN NEEDLE, DIABETIC 30 GX3/16"
1 NEEDLE, DISPOSABLE MISCELLANEOUS WEEKLY
Qty: 90 EACH | Refills: 3 | Status: SHIPPED | OUTPATIENT
Start: 2018-11-20 | End: 2021-06-02

## 2018-12-12 DIAGNOSIS — M79.2 NERVE PAIN: ICD-10-CM

## 2018-12-12 DIAGNOSIS — G89.4 CHRONIC PAIN SYNDROME: ICD-10-CM

## 2018-12-12 RX ORDER — GABAPENTIN 800 MG/1
TABLET ORAL
Qty: 120 TABLET | Refills: 1 | Status: SHIPPED | OUTPATIENT
Start: 2018-12-12 | End: 2019-02-08 | Stop reason: SDUPTHER

## 2018-12-19 ENCOUNTER — TELEPHONE (OUTPATIENT)
Dept: FAMILY MEDICINE CLINIC | Facility: CLINIC | Age: 58
End: 2018-12-19

## 2018-12-19 ENCOUNTER — OFFICE VISIT (OUTPATIENT)
Dept: INTERNAL MEDICINE | Facility: CLINIC | Age: 58
End: 2018-12-19

## 2018-12-19 VITALS
HEART RATE: 76 BPM | OXYGEN SATURATION: 98 % | SYSTOLIC BLOOD PRESSURE: 106 MMHG | TEMPERATURE: 96.9 F | HEIGHT: 62 IN | BODY MASS INDEX: 22.26 KG/M2 | WEIGHT: 121 LBS | DIASTOLIC BLOOD PRESSURE: 72 MMHG

## 2018-12-19 DIAGNOSIS — R21 RASH: Primary | ICD-10-CM

## 2018-12-19 PROCEDURE — 99213 OFFICE O/P EST LOW 20 MIN: CPT | Performed by: PHYSICIAN ASSISTANT

## 2018-12-19 RX ORDER — TRIAMCINOLONE ACETONIDE 1 MG/G
CREAM TOPICAL 2 TIMES DAILY
Qty: 15 G | Refills: 1 | Status: ON HOLD | OUTPATIENT
Start: 2018-12-19 | End: 2019-02-16

## 2018-12-19 NOTE — TELEPHONE ENCOUNTER
[facility] pharmacy called regarding Ms. Felty. They are requesting a telephone number for the patient. They states that they have tried to reach the patient, but have been unsuccessful.    Please contact [facility] pharmacy regarding the patient.     Please contact Jess with [facility] pharmacy at [phone]

## 2018-12-20 NOTE — TELEPHONE ENCOUNTER
Called [facility] and we had the same contact number for patient as they had. They have tried reaching her several times with no answer. They sent a letter in hopes of getting to speak with her. They will let us know first of the year if they have not been able to contact her.

## 2018-12-23 NOTE — PROGRESS NOTES
Subjective     Chief Complaint: rash    History of Present Illness     Connie May Felty is a 58 y.o. female presenting with complaints of itchy rash to left elbow present for nearly 2 months. She believes it started with a bug bite. She's tried using calamine lotion, triple antibiotic cream, hydrocortisone. It does not bleed or drain anything. Has not spread to any other part of body.     The following portions of the patient's history were reviewed and updated as appropriate: current medications, allergies, PMH.    Review of Systems   Constitutional: Negative for appetite change, chills, fatigue, fever and unexpected weight change.   HENT: Negative for congestion, ear pain, hearing loss, nosebleeds, sinus pressure, sore throat, tinnitus and trouble swallowing.    Eyes: Negative for pain, discharge, redness, itching and visual disturbance.   Respiratory: Negative for cough, chest tightness, shortness of breath and wheezing.    Cardiovascular: Negative for chest pain, palpitations and leg swelling.   Gastrointestinal: Negative for abdominal pain, blood in stool, constipation, diarrhea, nausea and vomiting.   Endocrine: Negative for cold intolerance, heat intolerance, polydipsia, polyphagia and polyuria.   Genitourinary: Negative for decreased urine volume, dysuria, flank pain, frequency and hematuria.   Musculoskeletal: Negative for arthralgias, back pain, gait problem, joint swelling, myalgias, neck pain and neck stiffness.   Skin: Positive for rash. Negative for color change.   Allergic/Immunologic: Negative for environmental allergies, food allergies and immunocompromised state.   Neurological: Negative for dizziness, syncope, weakness, light-headedness and headaches.   Hematological: Negative for adenopathy. Does not bruise/bleed easily.   Psychiatric/Behavioral: Negative for dysphoric mood, sleep disturbance and suicidal ideas. The patient is not nervous/anxious.      Objective     Vitals:    12/19/18 1721   BP:  "106/72   Pulse: 76   Temp: 96.9 °F (36.1 °C)   SpO2: 98%   Weight: 54.9 kg (121 lb)   Height: 157.5 cm (62.01\")     Physical Exam   Constitutional: She appears well-developed and well-nourished.   Skin: Rash noted. Rash is macular.   Erythematous, round patchy with several excoriations due to scratching left outer elbow.       Assessment/Plan     Diagnoses and all orders for this visit:    Rash  -     triamcinolone (KENALOG) 0.1 % cream; Apply  topically to the appropriate area as directed 2 (Two) Times a Day.    Patient had wanted steroid injection- discussed trying topical corticosteroid first due to small area of rash and potential risks of steroid infection.     Mary Moore PA-C  12/19/2018         Please note that portions of this note were completed with a voice recognition program. Efforts were made to edit dictation, but occasionally words are mistranscribed.    "

## 2019-01-23 DIAGNOSIS — M80.00XD OSTEOPOROSIS WITH CURRENT PATHOLOGICAL FRACTURE WITH ROUTINE HEALING, UNSPECIFIED OSTEOPOROSIS TYPE, SUBSEQUENT ENCOUNTER: ICD-10-CM

## 2019-01-23 DIAGNOSIS — G43.909 MIGRAINE WITHOUT STATUS MIGRAINOSUS, NOT INTRACTABLE, UNSPECIFIED MIGRAINE TYPE: ICD-10-CM

## 2019-01-23 DIAGNOSIS — G89.4 CHRONIC PAIN SYNDROME: ICD-10-CM

## 2019-01-23 RX ORDER — ACETAMINOPHEN 160 MG
2000 TABLET,DISINTEGRATING ORAL DAILY
Qty: 90 CAPSULE | Refills: 3 | Status: SHIPPED | OUTPATIENT
Start: 2019-01-23 | End: 2019-12-17 | Stop reason: SDUPTHER

## 2019-01-23 RX ORDER — TOPIRAMATE 100 MG/1
100 TABLET, FILM COATED ORAL 2 TIMES DAILY
Qty: 60 TABLET | Refills: 5 | Status: SHIPPED | OUTPATIENT
Start: 2019-01-23 | End: 2020-06-22 | Stop reason: SDUPTHER

## 2019-02-08 ENCOUNTER — OFFICE VISIT (OUTPATIENT)
Dept: INTERNAL MEDICINE | Facility: CLINIC | Age: 59
End: 2019-02-08

## 2019-02-08 VITALS
TEMPERATURE: 97.6 F | WEIGHT: 122.25 LBS | OXYGEN SATURATION: 99 % | DIASTOLIC BLOOD PRESSURE: 68 MMHG | HEART RATE: 58 BPM | HEIGHT: 62 IN | RESPIRATION RATE: 16 BRPM | SYSTOLIC BLOOD PRESSURE: 112 MMHG | BODY MASS INDEX: 22.5 KG/M2

## 2019-02-08 DIAGNOSIS — M80.80XD OTHER OSTEOPOROSIS WITH CURRENT PATHOLOGICAL FRACTURE WITH ROUTINE HEALING, SUBSEQUENT ENCOUNTER: ICD-10-CM

## 2019-02-08 DIAGNOSIS — G43.909 MIGRAINE WITHOUT STATUS MIGRAINOSUS, NOT INTRACTABLE, UNSPECIFIED MIGRAINE TYPE: ICD-10-CM

## 2019-02-08 DIAGNOSIS — Z23 NEED FOR INFLUENZA VACCINATION: ICD-10-CM

## 2019-02-08 DIAGNOSIS — M79.2 NERVE PAIN: Primary | ICD-10-CM

## 2019-02-08 DIAGNOSIS — G89.4 CHRONIC PAIN SYNDROME: ICD-10-CM

## 2019-02-08 PROBLEM — I95.1 ORTHOSTATIC HYPOTENSION: Status: RESOLVED | Noted: 2018-04-24 | Resolved: 2019-02-08

## 2019-02-08 PROBLEM — R07.2 PRECORDIAL PAIN: Status: RESOLVED | Noted: 2018-05-22 | Resolved: 2019-02-08

## 2019-02-08 PROCEDURE — 90674 CCIIV4 VAC NO PRSV 0.5 ML IM: CPT | Performed by: FAMILY MEDICINE

## 2019-02-08 PROCEDURE — 99213 OFFICE O/P EST LOW 20 MIN: CPT | Performed by: FAMILY MEDICINE

## 2019-02-08 PROCEDURE — 90471 IMMUNIZATION ADMIN: CPT | Performed by: FAMILY MEDICINE

## 2019-02-08 RX ORDER — GABAPENTIN 800 MG/1
800 TABLET ORAL 4 TIMES DAILY
Qty: 120 TABLET | Refills: 2 | Status: SHIPPED | OUTPATIENT
Start: 2019-02-08 | End: 2020-06-22 | Stop reason: SDUPTHER

## 2019-02-08 NOTE — PROGRESS NOTES
"Subjective    Connie May Felty is a 58 y.o. female here for:    Chief Complaint   Patient presents with   • Migraine     f/u patient states no problems or concerns today    • Pain     chronic nerve pain f/u    • Osteoporosis     f/u        History of Present Illness     Has started Forteo for her osteoporosis.  Thus far no reactions.  Migraines are stable at this time.  She continues gabapentin for her neuropathic pain continues to help.     The following portions of the patient's history were reviewed and updated as appropriate: allergies, current medications, past family history, past medical history, past social history, past surgical history and problem list.    Health Maintenance   Topic Date Due   • ANNUAL PHYSICAL  10/02/1963   • INFLUENZA VACCINE  08/01/2018   • TDAP/TD VACCINES (1 - Tdap) 01/01/2020 (Originally 6/4/2005)   • ZOSTER VACCINE (1 of 2) 02/17/2020 (Originally 10/2/2011)   • MAMMOGRAM  04/02/2020   • DXA SCAN  10/24/2020   • COLONOSCOPY  07/31/2028   • HEPATITIS C SCREENING  Completed   • PAP SMEAR  Discontinued       Review of Systems   Constitutional: Positive for activity change.   Musculoskeletal: Positive for arthralgias.   Psychiatric/Behavioral: Positive for stress.       Vitals:    02/08/19 1055   BP: 112/68   Pulse: 58   Resp: 16   Temp: 97.6 °F (36.4 °C)   TempSrc: Temporal   SpO2: 99%   Weight: 55.5 kg (122 lb 4 oz)   Height: 157.5 cm (62.01\")         Objective   Physical Exam   Constitutional: She is oriented to person, place, and time. Vital signs are normal. She appears well-developed and well-nourished. She is active.  Non-toxic appearance. She does not appear ill. No distress.   HENT:   Head: Normocephalic and atraumatic.   Right Ear: Hearing normal.   Left Ear: Hearing normal.   Mouth/Throat: Mucous membranes are not dry.   Eyes: EOM are normal. No scleral icterus.   Neck: Phonation normal. Neck supple.   Cardiovascular: Normal rate, regular rhythm and normal heart sounds. "   Pulmonary/Chest: Effort normal and breath sounds normal.   Neurological: She is alert and oriented to person, place, and time. She displays no tremor. No cranial nerve deficit.   Skin: Skin is warm. No rash noted. She is not diaphoretic. No pallor.   Psychiatric: She has a normal mood and affect. Her speech is normal and behavior is normal. Judgment and thought content normal. Cognition and memory are normal.   Nursing note and vitals reviewed.        Assessment/Plan     Problem List Items Addressed This Visit        Cardiovascular and Mediastinum    Migraine without status migrainosus, not intractable    Overview     · Current therapy: Topamax 100 mg bid         Current Assessment & Plan     Headaches are improving with treatment.  Continue current treatment regimen.             Relevant Medications    gabapentin (NEURONTIN) 800 MG tablet       Nervous and Auditory    Nerve pain - Primary    Relevant Medications    gabapentin (NEURONTIN) 800 MG tablet    Chronic pain syndrome    Relevant Medications    gabapentin (NEURONTIN) 800 MG tablet       Musculoskeletal and Integument    Osteoporosis    Overview     · Has had compression fracture  · Current therapy: Forteo (started approx 11/2018)         Current Assessment & Plan     Continue Forteo x 24 months.            Other Visit Diagnoses     Need for influenza vaccination        Relevant Orders    Flucelvax Quad=>4Years ([id number]) (Completed)          ·     Return in about 6 months (around 8/8/2019) for Follow up on current issues.    Eugenie Rivera MD

## 2019-02-15 ENCOUNTER — OFFICE VISIT (OUTPATIENT)
Dept: INTERNAL MEDICINE | Facility: CLINIC | Age: 59
End: 2019-02-15

## 2019-02-15 VITALS
OXYGEN SATURATION: 94 % | BODY MASS INDEX: 21.76 KG/M2 | HEART RATE: 57 BPM | SYSTOLIC BLOOD PRESSURE: 118 MMHG | DIASTOLIC BLOOD PRESSURE: 62 MMHG | TEMPERATURE: 95.4 F | WEIGHT: 119 LBS

## 2019-02-15 DIAGNOSIS — Z87.19 HISTORY OF SMALL BOWEL OBSTRUCTION: ICD-10-CM

## 2019-02-15 DIAGNOSIS — K92.1 MELENA: Primary | ICD-10-CM

## 2019-02-15 LAB
ALBUMIN SERPL-MCNC: 4 G/DL (ref 3.5–5)
ALBUMIN/GLOB SERPL: 1.6 G/DL (ref 1–2)
ALP SERPL-CCNC: 116 U/L (ref 38–126)
ALT SERPL-CCNC: 33 U/L (ref 13–69)
AST SERPL-CCNC: 23 U/L (ref 15–46)
BASOPHILS # BLD AUTO: 0.02 10*3/MM3 (ref 0–0.2)
BASOPHILS NFR BLD AUTO: 0.4 % (ref 0–2.5)
BILIRUB SERPL-MCNC: 0.7 MG/DL (ref 0.2–1.3)
BUN SERPL-MCNC: 7 MG/DL (ref 7–20)
BUN/CREAT SERPL: 10 (ref 7.1–23.5)
CALCIUM SERPL-MCNC: 9.6 MG/DL (ref 8.4–10.2)
CHLORIDE SERPL-SCNC: 109 MMOL/L (ref 98–107)
CO2 SERPL-SCNC: 22 MMOL/L (ref 26–30)
CREAT SERPL-MCNC: 0.7 MG/DL (ref 0.6–1.3)
EOSINOPHIL # BLD AUTO: 0.07 10*3/MM3 (ref 0–0.7)
EOSINOPHIL NFR BLD AUTO: 1.3 % (ref 0–7)
ERYTHROCYTE [DISTWIDTH] IN BLOOD BY AUTOMATED COUNT: 13.4 % (ref 11.5–14.5)
GGT SERPL-CCNC: 18 U/L (ref 12–58)
GLOBULIN SER CALC-MCNC: 2.5 GM/DL
GLUCOSE SERPL-MCNC: 83 MG/DL (ref 74–98)
HCT VFR BLD AUTO: 40.9 % (ref 37–47)
HGB BLD-MCNC: 13.6 G/DL (ref 12–16)
IMM GRANULOCYTES # BLD AUTO: 0.01 10*3/MM3 (ref 0–0.06)
IMM GRANULOCYTES NFR BLD AUTO: 0.2 % (ref 0–0.6)
IRON SATN MFR SERPL: 19 % (ref 11–46)
IRON SERPL-MCNC: 80 MCG/DL (ref 37–181)
LYMPHOCYTES # BLD AUTO: 1.78 10*3/MM3 (ref 0.6–3.4)
LYMPHOCYTES NFR BLD AUTO: 33.8 % (ref 10–50)
MCH RBC QN AUTO: 29.4 PG (ref 27–31)
MCHC RBC AUTO-ENTMCNC: 33.3 G/DL (ref 30–37)
MCV RBC AUTO: 88.3 FL (ref 81–99)
MONOCYTES # BLD AUTO: 0.37 10*3/MM3 (ref 0–0.9)
MONOCYTES NFR BLD AUTO: 7 % (ref 0–12)
NEUTROPHILS # BLD AUTO: 3.02 10*3/MM3 (ref 2–6.9)
NEUTROPHILS NFR BLD AUTO: 57.3 % (ref 37–80)
NRBC BLD AUTO-RTO: 0 /100 WBC (ref 0–0)
PLATELET # BLD AUTO: 200 10*3/MM3 (ref 130–400)
POTASSIUM SERPL-SCNC: 4.1 MMOL/L (ref 3.5–5.1)
PROT SERPL-MCNC: 6.5 G/DL (ref 6.3–8.2)
RBC # BLD AUTO: 4.63 10*6/MM3 (ref 4.2–5.4)
SODIUM SERPL-SCNC: 140 MMOL/L (ref 137–145)
TIBC SERPL-MCNC: 415 MCG/DL (ref 261–497)
UIBC SERPL-MCNC: 335 MCG/DL
WBC # BLD AUTO: 5.27 10*3/MM3 (ref 4.8–10.8)

## 2019-02-15 PROCEDURE — 99214 OFFICE O/P EST MOD 30 MIN: CPT | Performed by: FAMILY MEDICINE

## 2019-02-15 RX ORDER — PANTOPRAZOLE SODIUM 40 MG/1
40 TABLET, DELAYED RELEASE ORAL DAILY
Qty: 30 TABLET | Refills: 0 | Status: SHIPPED | OUTPATIENT
Start: 2019-02-15 | End: 2020-06-22

## 2019-02-15 NOTE — PROGRESS NOTES
Subjective    Connie May Felty is a 58 y.o. female here for:    Chief Complaint   Patient presents with   • Rectal Bleeding     first noticed it this morning. stool was dark/ noticed it was dark a couple months ago        History of Present Illness   Was bleeding last July when she had colonoscopy but it has continued. Says she did not get a reason why she was bleeding. She notes stools have been dark but today was the first time in months she's seen blood in the toilet. She's concerned as prior to her bowel obstruction she had bleeding. She has occasional abdominal pain. She feels she has a high tolerance to pain, though. Appetite is not great. Comes and goes. Some nausea, has had to take Carafate at times. No straining today for the bowel movement associated with blood. Some black streaks in the stool.     The following portions of the patient's history were reviewed and updated as appropriate: allergies, current medications, past family history, past medical history, past social history, past surgical history and problem list.    Health Maintenance   Topic Date Due   • ANNUAL PHYSICAL  10/02/1963   • TDAP/TD VACCINES (1 - Tdap) 01/01/2020 (Originally 6/4/2005)   • ZOSTER VACCINE (1 of 2) 02/17/2020 (Originally 10/2/2011)   • MAMMOGRAM  04/02/2020   • DXA SCAN  10/24/2020   • COLONOSCOPY  07/31/2028   • HEPATITIS C SCREENING  Completed   • INFLUENZA VACCINE  Completed   • PAP SMEAR  Discontinued       Review of Systems   Constitutional: Positive for appetite change and fatigue.   Gastrointestinal: Positive for abdominal pain, blood in stool and nausea. Negative for vomiting.   Musculoskeletal: Positive for arthralgias.       Vitals:    02/15/19 1302   BP: 118/62   Pulse: 57   Temp: 95.4 °F (35.2 °C)   TempSrc: Temporal   SpO2: 94%   Weight: 54 kg (119 lb)         Objective   Physical Exam   Constitutional: She is oriented to person, place, and time. Vital signs are normal. She appears well-developed and  well-nourished. She is active.  Non-toxic appearance. She does not appear ill. No distress.   HENT:   Head: Normocephalic and atraumatic.   Right Ear: Hearing normal.   Left Ear: Hearing normal.   Mouth/Throat: Mucous membranes are not dry.   Eyes: EOM are normal. No scleral icterus.   Neck: Phonation normal. Neck supple.   Pulmonary/Chest: Effort normal.   Abdominal: Soft. Bowel sounds are normal. She exhibits no distension. There is no tenderness. There is no rebound and no guarding.   Neurological: She is alert and oriented to person, place, and time. She displays no tremor. No cranial nerve deficit.   Skin: Skin is warm. No rash noted. She is not diaphoretic. No pallor.   Psychiatric: She has a normal mood and affect. Her speech is normal and behavior is normal. Judgment and thought content normal. Cognition and memory are normal.   Nursing note and vitals reviewed.      7/31/18 colonoscopy normal, repeat 10 years.     Assessment/Plan     Problem List Items Addressed This Visit     None      Visit Diagnoses     Melena    -  Primary    Relevant Medications    pantoprazole (PROTONIX) 40 MG EC tablet    Other Relevant Orders    Ambulatory referral for Screening EGD    CBC & Differential    Comprehensive Metabolic Panel    Iron Profile    Gamma GT    History of small bowel obstruction        Relevant Orders    Ambulatory referral for Screening EGD          Patient instructed to go to ER if condition worsens over weekend. She stated understanding.    Eugenie Rivera MD

## 2019-02-16 ENCOUNTER — APPOINTMENT (OUTPATIENT)
Dept: CT IMAGING | Facility: HOSPITAL | Age: 59
End: 2019-02-16

## 2019-02-16 ENCOUNTER — HOSPITAL ENCOUNTER (OUTPATIENT)
Facility: HOSPITAL | Age: 59
Discharge: HOME | End: 2019-02-17
Attending: EMERGENCY MEDICINE | Admitting: SURGERY

## 2019-02-16 DIAGNOSIS — K92.1 GASTROINTESTINAL HEMORRHAGE WITH MELENA: ICD-10-CM

## 2019-02-16 DIAGNOSIS — K92.2 GASTROINTESTINAL HEMORRHAGE, UNSPECIFIED GASTROINTESTINAL HEMORRHAGE TYPE: Primary | ICD-10-CM

## 2019-02-16 DIAGNOSIS — D62 ACUTE BLOOD LOSS ANEMIA: ICD-10-CM

## 2019-02-16 LAB
ALBUMIN SERPL-MCNC: 2.6 G/DL (ref 3.5–5)
ALBUMIN/GLOB SERPL: 1.2 G/DL (ref 1–2)
ALP SERPL-CCNC: 77 U/L (ref 38–126)
ALT SERPL W P-5'-P-CCNC: 25 U/L (ref 13–69)
ANION GAP SERPL CALCULATED.3IONS-SCNC: 8.4 MMOL/L (ref 10–20)
AST SERPL-CCNC: 16 U/L (ref 15–46)
BASOPHILS # BLD AUTO: 0.03 10*3/MM3 (ref 0–0.2)
BASOPHILS NFR BLD AUTO: 0.8 % (ref 0–2.5)
BILIRUB SERPL-MCNC: 0.5 MG/DL (ref 0.2–1.3)
BUN BLD-MCNC: 8 MG/DL (ref 7–20)
BUN/CREAT SERPL: 13.3 (ref 7.1–23.5)
CALCIUM SPEC-SCNC: 7 MG/DL (ref 8.4–10.2)
CHLORIDE SERPL-SCNC: 116 MMOL/L (ref 98–107)
CO2 SERPL-SCNC: 21 MMOL/L (ref 26–30)
CREAT BLD-MCNC: 0.6 MG/DL (ref 0.6–1.3)
DEPRECATED RDW RBC AUTO: 43.9 FL (ref 37–54)
EOSINOPHIL # BLD AUTO: 0.05 10*3/MM3 (ref 0–0.7)
EOSINOPHIL NFR BLD AUTO: 1.4 % (ref 0–7)
ERYTHROCYTE [DISTWIDTH] IN BLOOD BY AUTOMATED COUNT: 13.3 % (ref 11.5–14.5)
GFR SERPL CREATININE-BSD FRML MDRD: 103 ML/MIN/1.73
GLOBULIN UR ELPH-MCNC: 2.1 GM/DL
GLUCOSE BLD-MCNC: 64 MG/DL (ref 74–98)
HCT VFR BLD AUTO: 35.1 % (ref 37–47)
HCT VFR BLD AUTO: 40.6 % (ref 37–47)
HEMOCCULT STL QL: NEGATIVE
HGB BLD-MCNC: 11.3 G/DL (ref 12–16)
HGB BLD-MCNC: 13.3 G/DL (ref 12–16)
IMM GRANULOCYTES # BLD AUTO: 0 10*3/MM3 (ref 0–0.06)
IMM GRANULOCYTES NFR BLD AUTO: 0 % (ref 0–0.6)
IRON 24H UR-MRATE: 78 MCG/DL (ref 37–181)
IRON SATN MFR SERPL: 20 % (ref 11–46)
LIPASE SERPL-CCNC: 70 U/L (ref 23–300)
LYMPHOCYTES # BLD AUTO: 1.21 10*3/MM3 (ref 0.6–3.4)
LYMPHOCYTES NFR BLD AUTO: 34.2 % (ref 10–50)
MCH RBC QN AUTO: 29 PG (ref 27–31)
MCHC RBC AUTO-ENTMCNC: 32.2 G/DL (ref 30–37)
MCV RBC AUTO: 90 FL (ref 81–99)
MONOCYTES # BLD AUTO: 0.22 10*3/MM3 (ref 0–0.9)
MONOCYTES NFR BLD AUTO: 6.2 % (ref 0–12)
NEUTROPHILS # BLD AUTO: 2.03 10*3/MM3 (ref 2–6.9)
NEUTROPHILS NFR BLD AUTO: 57.4 % (ref 37–80)
NRBC BLD AUTO-RTO: 0 /100 WBC (ref 0–0)
PLATELET # BLD AUTO: 129 10*3/MM3 (ref 130–400)
PMV BLD AUTO: 10.6 FL (ref 6–12)
POTASSIUM BLD-SCNC: 3.4 MMOL/L (ref 3.5–5.1)
PROT SERPL-MCNC: 4.7 G/DL (ref 6.3–8.2)
RBC # BLD AUTO: 3.9 10*6/MM3 (ref 4.2–5.4)
SODIUM BLD-SCNC: 142 MMOL/L (ref 137–145)
TIBC SERPL-MCNC: 382 MCG/DL (ref 261–497)
WBC NRBC COR # BLD: 3.54 10*3/MM3 (ref 4.8–10.8)
WHOLE BLOOD HOLD SPECIMEN: NORMAL

## 2019-02-16 PROCEDURE — 83540 ASSAY OF IRON: CPT | Performed by: INTERNAL MEDICINE

## 2019-02-16 PROCEDURE — 99220 PR INITIAL OBSERVATION CARE/DAY 70 MINUTES: CPT | Performed by: INTERNAL MEDICINE

## 2019-02-16 PROCEDURE — 96365 THER/PROPH/DIAG IV INF INIT: CPT

## 2019-02-16 PROCEDURE — 82272 OCCULT BLD FECES 1-3 TESTS: CPT | Performed by: EMERGENCY MEDICINE

## 2019-02-16 PROCEDURE — 99284 EMERGENCY DEPT VISIT MOD MDM: CPT

## 2019-02-16 PROCEDURE — 80053 COMPREHEN METABOLIC PANEL: CPT | Performed by: EMERGENCY MEDICINE

## 2019-02-16 PROCEDURE — 74177 CT ABD & PELVIS W/CONTRAST: CPT

## 2019-02-16 PROCEDURE — G0378 HOSPITAL OBSERVATION PER HR: HCPCS

## 2019-02-16 PROCEDURE — 96366 THER/PROPH/DIAG IV INF ADDON: CPT

## 2019-02-16 PROCEDURE — 25010000002 PROMETHAZINE PER 50 MG: Performed by: INTERNAL MEDICINE

## 2019-02-16 PROCEDURE — 96376 TX/PRO/DX INJ SAME DRUG ADON: CPT

## 2019-02-16 PROCEDURE — 83550 IRON BINDING TEST: CPT | Performed by: INTERNAL MEDICINE

## 2019-02-16 PROCEDURE — 85014 HEMATOCRIT: CPT | Performed by: INTERNAL MEDICINE

## 2019-02-16 PROCEDURE — 96375 TX/PRO/DX INJ NEW DRUG ADDON: CPT

## 2019-02-16 PROCEDURE — 85018 HEMOGLOBIN: CPT | Performed by: INTERNAL MEDICINE

## 2019-02-16 PROCEDURE — 25810000003 SODIUM CHLORIDE 0.9 % WITH KCL 20 MEQ 20-0.9 MEQ/L-% SOLUTION: Performed by: INTERNAL MEDICINE

## 2019-02-16 PROCEDURE — 83690 ASSAY OF LIPASE: CPT | Performed by: INTERNAL MEDICINE

## 2019-02-16 PROCEDURE — 85025 COMPLETE CBC W/AUTO DIFF WBC: CPT | Performed by: EMERGENCY MEDICINE

## 2019-02-16 PROCEDURE — 25010000002 IOPAMIDOL 61 % SOLUTION: Performed by: EMERGENCY MEDICINE

## 2019-02-16 PROCEDURE — 99244 OFF/OP CNSLTJ NEW/EST MOD 40: CPT | Performed by: SURGERY

## 2019-02-16 RX ORDER — ACETAMINOPHEN 325 MG/1
650 TABLET ORAL EVERY 4 HOURS PRN
Status: DISCONTINUED | OUTPATIENT
Start: 2019-02-16 | End: 2019-02-17 | Stop reason: HOSPADM

## 2019-02-16 RX ORDER — PROMETHAZINE HYDROCHLORIDE 25 MG/1
12.5 SUPPOSITORY RECTAL EVERY 6 HOURS PRN
Status: DISCONTINUED | OUTPATIENT
Start: 2019-02-16 | End: 2019-02-17 | Stop reason: HOSPADM

## 2019-02-16 RX ORDER — SODIUM CHLORIDE 0.9 % (FLUSH) 0.9 %
3-10 SYRINGE (ML) INJECTION AS NEEDED
Status: DISCONTINUED | OUTPATIENT
Start: 2019-02-16 | End: 2019-02-17 | Stop reason: HOSPADM

## 2019-02-16 RX ORDER — BISACODYL 10 MG
10 SUPPOSITORY, RECTAL RECTAL DAILY PRN
Status: DISCONTINUED | OUTPATIENT
Start: 2019-02-16 | End: 2019-02-17

## 2019-02-16 RX ORDER — PROMETHAZINE HYDROCHLORIDE 25 MG/ML
12.5 INJECTION, SOLUTION INTRAMUSCULAR; INTRAVENOUS EVERY 6 HOURS PRN
Status: DISCONTINUED | OUTPATIENT
Start: 2019-02-16 | End: 2019-02-17 | Stop reason: HOSPADM

## 2019-02-16 RX ORDER — PANTOPRAZOLE SODIUM 40 MG/10ML
80 INJECTION, POWDER, LYOPHILIZED, FOR SOLUTION INTRAVENOUS ONCE
Status: COMPLETED | OUTPATIENT
Start: 2019-02-16 | End: 2019-02-16

## 2019-02-16 RX ORDER — MAGNESIUM CITRATE
296 SOLUTION, ORAL ORAL ONCE
Status: COMPLETED | OUTPATIENT
Start: 2019-02-16 | End: 2019-02-16

## 2019-02-16 RX ORDER — BISACODYL 5 MG/1
20 TABLET, DELAYED RELEASE ORAL ONCE
Status: COMPLETED | OUTPATIENT
Start: 2019-02-16 | End: 2019-02-16

## 2019-02-16 RX ORDER — SODIUM CHLORIDE 9 MG/ML
INJECTION, SOLUTION INTRAVENOUS
Status: COMPLETED
Start: 2019-02-16 | End: 2019-02-16

## 2019-02-16 RX ORDER — GABAPENTIN 400 MG/1
800 CAPSULE ORAL 4 TIMES DAILY
Status: DISCONTINUED | OUTPATIENT
Start: 2019-02-16 | End: 2019-02-17 | Stop reason: HOSPADM

## 2019-02-16 RX ORDER — LANOLIN ALCOHOL/MO/W.PET/CERES
6 CREAM (GRAM) TOPICAL NIGHTLY PRN
Status: DISCONTINUED | OUTPATIENT
Start: 2019-02-16 | End: 2019-02-17 | Stop reason: HOSPADM

## 2019-02-16 RX ORDER — FLUTICASONE PROPIONATE 50 MCG
2 SPRAY, SUSPENSION (ML) NASAL DAILY
Status: DISCONTINUED | OUTPATIENT
Start: 2019-02-17 | End: 2019-02-17 | Stop reason: HOSPADM

## 2019-02-16 RX ORDER — SODIUM CHLORIDE 0.9 % (FLUSH) 0.9 %
3 SYRINGE (ML) INJECTION EVERY 12 HOURS SCHEDULED
Status: DISCONTINUED | OUTPATIENT
Start: 2019-02-16 | End: 2019-02-17 | Stop reason: HOSPADM

## 2019-02-16 RX ORDER — SODIUM CHLORIDE AND POTASSIUM CHLORIDE 150; 900 MG/100ML; MG/100ML
75 INJECTION, SOLUTION INTRAVENOUS CONTINUOUS
Status: DISCONTINUED | OUTPATIENT
Start: 2019-02-16 | End: 2019-02-17 | Stop reason: HOSPADM

## 2019-02-16 RX ORDER — SODIUM CHLORIDE 9 MG/ML
INJECTION, SOLUTION INTRAVENOUS
Status: DISPENSED
Start: 2019-02-16 | End: 2019-02-17

## 2019-02-16 RX ORDER — SODIUM CHLORIDE 0.9 % (FLUSH) 0.9 %
10 SYRINGE (ML) INJECTION AS NEEDED
Status: DISCONTINUED | OUTPATIENT
Start: 2019-02-16 | End: 2019-02-17 | Stop reason: HOSPADM

## 2019-02-16 RX ORDER — TOPIRAMATE 100 MG/1
100 TABLET, FILM COATED ORAL 2 TIMES DAILY
Status: DISCONTINUED | OUTPATIENT
Start: 2019-02-16 | End: 2019-02-17 | Stop reason: HOSPADM

## 2019-02-16 RX ORDER — PROMETHAZINE HYDROCHLORIDE 12.5 MG/1
12.5 TABLET ORAL EVERY 6 HOURS PRN
Status: DISCONTINUED | OUTPATIENT
Start: 2019-02-16 | End: 2019-02-17 | Stop reason: HOSPADM

## 2019-02-16 RX ADMIN — GABAPENTIN 800 MG: 400 CAPSULE ORAL at 21:15

## 2019-02-16 RX ADMIN — BISACODYL 20 MG: 5 TABLET, COATED ORAL at 19:45

## 2019-02-16 RX ADMIN — PROMETHAZINE HYDROCHLORIDE 12.5 MG: 25 INJECTION INTRAMUSCULAR; INTRAVENOUS at 22:15

## 2019-02-16 RX ADMIN — TOPIRAMATE 100 MG: 100 TABLET, FILM COATED ORAL at 21:15

## 2019-02-16 RX ADMIN — POTASSIUM CHLORIDE AND SODIUM CHLORIDE 75 ML/HR: 900; 150 INJECTION, SOLUTION INTRAVENOUS at 15:42

## 2019-02-16 RX ADMIN — PROMETHAZINE HYDROCHLORIDE 12.5 MG: 25 INJECTION INTRAMUSCULAR; INTRAVENOUS at 17:21

## 2019-02-16 RX ADMIN — MAGNESIUM CITRATE 296 ML: 1.75 LIQUID ORAL at 19:45

## 2019-02-16 RX ADMIN — PANTOPRAZOLE SODIUM 80 MG: 40 INJECTION, POWDER, FOR SOLUTION INTRAVENOUS at 10:56

## 2019-02-16 RX ADMIN — SODIUM CHLORIDE 50 ML: 9 INJECTION, SOLUTION INTRAVENOUS at 17:21

## 2019-02-16 RX ADMIN — SODIUM CHLORIDE 8 MG/HR: 9 INJECTION, SOLUTION INTRAVENOUS at 11:43

## 2019-02-16 RX ADMIN — GABAPENTIN 800 MG: 400 CAPSULE ORAL at 16:45

## 2019-02-16 RX ADMIN — IOPAMIDOL 80 ML: 612 INJECTION, SOLUTION INTRAVENOUS at 10:46

## 2019-02-16 RX ADMIN — SODIUM CHLORIDE 8 MG/HR: 9 INJECTION, SOLUTION INTRAVENOUS at 22:15

## 2019-02-16 RX ADMIN — MAGNESIUM CITRATE 296 ML: 1.75 LIQUID ORAL at 16:51

## 2019-02-16 RX ADMIN — BISACODYL 20 MG: 5 TABLET, COATED ORAL at 16:46

## 2019-02-16 RX ADMIN — SODIUM CHLORIDE 8 MG/HR: 9 INJECTION, SOLUTION INTRAVENOUS at 16:45

## 2019-02-17 ENCOUNTER — ANESTHESIA (OUTPATIENT)
Dept: GASTROENTEROLOGY | Facility: HOSPITAL | Age: 59
End: 2019-02-17

## 2019-02-17 ENCOUNTER — ANESTHESIA EVENT (OUTPATIENT)
Dept: GASTROENTEROLOGY | Facility: HOSPITAL | Age: 59
End: 2019-02-17

## 2019-02-17 VITALS
TEMPERATURE: 97.7 F | BODY MASS INDEX: 20.12 KG/M2 | WEIGHT: 113.56 LBS | HEART RATE: 85 BPM | SYSTOLIC BLOOD PRESSURE: 130 MMHG | DIASTOLIC BLOOD PRESSURE: 68 MMHG | RESPIRATION RATE: 17 BRPM | HEIGHT: 63 IN | OXYGEN SATURATION: 100 %

## 2019-02-17 LAB
ANION GAP SERPL CALCULATED.3IONS-SCNC: 9.1 MMOL/L (ref 10–20)
BASOPHILS # BLD AUTO: 0.03 10*3/MM3 (ref 0–0.2)
BASOPHILS NFR BLD AUTO: 0.7 % (ref 0–2.5)
BUN BLD-MCNC: 8 MG/DL (ref 7–20)
BUN/CREAT SERPL: 10 (ref 7.1–23.5)
CALCIUM SPEC-SCNC: 8.6 MG/DL (ref 8.4–10.2)
CHLORIDE SERPL-SCNC: 116 MMOL/L (ref 98–107)
CO2 SERPL-SCNC: 18 MMOL/L (ref 26–30)
CREAT BLD-MCNC: 0.8 MG/DL (ref 0.6–1.3)
DEPRECATED RDW RBC AUTO: 43.6 FL (ref 37–54)
EOSINOPHIL # BLD AUTO: 0.04 10*3/MM3 (ref 0–0.7)
EOSINOPHIL NFR BLD AUTO: 1 % (ref 0–7)
ERYTHROCYTE [DISTWIDTH] IN BLOOD BY AUTOMATED COUNT: 13.2 % (ref 11.5–14.5)
GFR SERPL CREATININE-BSD FRML MDRD: 74 ML/MIN/1.73
GLUCOSE BLD-MCNC: 83 MG/DL (ref 74–98)
GLUCOSE BLDC GLUCOMTR-MCNC: 66 MG/DL (ref 70–130)
HCT VFR BLD AUTO: 39.1 % (ref 37–47)
HCT VFR BLD AUTO: 46.1 % (ref 37–47)
HGB BLD-MCNC: 12.9 G/DL (ref 12–16)
HGB BLD-MCNC: 14.8 G/DL (ref 12–16)
HOLD SPECIMEN: NORMAL
HOLD SPECIMEN: NORMAL
IMM GRANULOCYTES # BLD AUTO: 0 10*3/MM3 (ref 0–0.06)
IMM GRANULOCYTES NFR BLD AUTO: 0 % (ref 0–0.6)
LYMPHOCYTES # BLD AUTO: 1.57 10*3/MM3 (ref 0.6–3.4)
LYMPHOCYTES NFR BLD AUTO: 38.8 % (ref 10–50)
MAGNESIUM SERPL-MCNC: 2.3 MG/DL (ref 1.6–2.3)
MCH RBC QN AUTO: 30 PG (ref 27–31)
MCHC RBC AUTO-ENTMCNC: 33 G/DL (ref 30–37)
MCV RBC AUTO: 90.9 FL (ref 81–99)
MONOCYTES # BLD AUTO: 0.3 10*3/MM3 (ref 0–0.9)
MONOCYTES NFR BLD AUTO: 7.4 % (ref 0–12)
NEUTROPHILS # BLD AUTO: 2.11 10*3/MM3 (ref 2–6.9)
NEUTROPHILS NFR BLD AUTO: 52.1 % (ref 37–80)
NRBC BLD AUTO-RTO: 0 /100 WBC (ref 0–0)
PHOSPHATE SERPL-MCNC: 4.5 MG/DL (ref 2.5–4.5)
PLATELET # BLD AUTO: 172 10*3/MM3 (ref 130–400)
PMV BLD AUTO: 10.4 FL (ref 6–12)
POTASSIUM BLD-SCNC: 4.1 MMOL/L (ref 3.5–5.1)
RBC # BLD AUTO: 4.3 10*6/MM3 (ref 4.2–5.4)
SODIUM BLD-SCNC: 139 MMOL/L (ref 137–145)
WBC NRBC COR # BLD: 4.05 10*3/MM3 (ref 4.8–10.8)
WHOLE BLOOD HOLD SPECIMEN: NORMAL

## 2019-02-17 PROCEDURE — 25010000002 PROMETHAZINE PER 50 MG: Performed by: INTERNAL MEDICINE

## 2019-02-17 PROCEDURE — 80048 BASIC METABOLIC PNL TOTAL CA: CPT | Performed by: INTERNAL MEDICINE

## 2019-02-17 PROCEDURE — 83735 ASSAY OF MAGNESIUM: CPT | Performed by: INTERNAL MEDICINE

## 2019-02-17 PROCEDURE — G0378 HOSPITAL OBSERVATION PER HR: HCPCS

## 2019-02-17 PROCEDURE — 85025 COMPLETE CBC W/AUTO DIFF WBC: CPT | Performed by: INTERNAL MEDICINE

## 2019-02-17 PROCEDURE — 96376 TX/PRO/DX INJ SAME DRUG ADON: CPT

## 2019-02-17 PROCEDURE — 99217 PR OBSERVATION CARE DISCHARGE MANAGEMENT: CPT | Performed by: INTERNAL MEDICINE

## 2019-02-17 PROCEDURE — 84100 ASSAY OF PHOSPHORUS: CPT | Performed by: INTERNAL MEDICINE

## 2019-02-17 PROCEDURE — 25810000003 SODIUM CHLORIDE 0.9 % WITH KCL 20 MEQ 20-0.9 MEQ/L-% SOLUTION: Performed by: INTERNAL MEDICINE

## 2019-02-17 PROCEDURE — 85018 HEMOGLOBIN: CPT | Performed by: INTERNAL MEDICINE

## 2019-02-17 PROCEDURE — 82962 GLUCOSE BLOOD TEST: CPT

## 2019-02-17 PROCEDURE — 25010000002 PROPOFOL 200 MG/20ML EMULSION: Performed by: NURSE ANESTHETIST, CERTIFIED REGISTERED

## 2019-02-17 PROCEDURE — 85014 HEMATOCRIT: CPT | Performed by: INTERNAL MEDICINE

## 2019-02-17 PROCEDURE — 96366 THER/PROPH/DIAG IV INF ADDON: CPT

## 2019-02-17 RX ORDER — PROPOFOL 10 MG/ML
INJECTION, EMULSION INTRAVENOUS AS NEEDED
Status: DISCONTINUED | OUTPATIENT
Start: 2019-02-17 | End: 2019-02-17 | Stop reason: SURG

## 2019-02-17 RX ORDER — MAGNESIUM HYDROXIDE 1200 MG/15ML
LIQUID ORAL AS NEEDED
Status: DISCONTINUED | OUTPATIENT
Start: 2019-02-17 | End: 2019-02-17 | Stop reason: HOSPADM

## 2019-02-17 RX ORDER — DEXTROSE, SODIUM CHLORIDE, SODIUM LACTATE, POTASSIUM CHLORIDE, AND CALCIUM CHLORIDE 5; .6; .31; .03; .02 G/100ML; G/100ML; G/100ML; G/100ML; G/100ML
125 INJECTION, SOLUTION INTRAVENOUS CONTINUOUS
Status: DISCONTINUED | OUTPATIENT
Start: 2019-02-17 | End: 2019-02-17 | Stop reason: HOSPADM

## 2019-02-17 RX ORDER — SODIUM CHLORIDE 9 MG/ML
INJECTION, SOLUTION INTRAVENOUS
Status: DISCONTINUED
Start: 2019-02-17 | End: 2019-02-17 | Stop reason: HOSPADM

## 2019-02-17 RX ORDER — SODIUM CHLORIDE, SODIUM LACTATE, POTASSIUM CHLORIDE, CALCIUM CHLORIDE 600; 310; 30; 20 MG/100ML; MG/100ML; MG/100ML; MG/100ML
1000 INJECTION, SOLUTION INTRAVENOUS CONTINUOUS
Status: DISCONTINUED | OUTPATIENT
Start: 2019-02-17 | End: 2019-02-17

## 2019-02-17 RX ADMIN — SODIUM CHLORIDE 500 ML: 9 INJECTION, SOLUTION INTRAVENOUS at 04:20

## 2019-02-17 RX ADMIN — PROPOFOL 50 MG: 10 INJECTION, EMULSION INTRAVENOUS at 08:19

## 2019-02-17 RX ADMIN — TOPIRAMATE 100 MG: 100 TABLET, FILM COATED ORAL at 10:26

## 2019-02-17 RX ADMIN — PROPOFOL 50 MG: 10 INJECTION, EMULSION INTRAVENOUS at 08:30

## 2019-02-17 RX ADMIN — VITAMIN D, TAB 1000IU (100/BT) 2000 UNITS: 25 TAB at 10:25

## 2019-02-17 RX ADMIN — GABAPENTIN 800 MG: 400 CAPSULE ORAL at 12:02

## 2019-02-17 RX ADMIN — PROPOFOL 50 MG: 10 INJECTION, EMULSION INTRAVENOUS at 08:24

## 2019-02-17 RX ADMIN — SODIUM CHLORIDE 8 MG/HR: 9 INJECTION, SOLUTION INTRAVENOUS at 02:30

## 2019-02-17 RX ADMIN — POTASSIUM CHLORIDE AND SODIUM CHLORIDE 75 ML/HR: 900; 150 INJECTION, SOLUTION INTRAVENOUS at 04:55

## 2019-02-17 RX ADMIN — PROPOFOL 50 MG: 10 INJECTION, EMULSION INTRAVENOUS at 08:21

## 2019-02-17 RX ADMIN — OYSTER SHELL CALCIUM WITH VITAMIN D 1 TABLET: 500; 200 TABLET, FILM COATED ORAL at 10:27

## 2019-02-17 RX ADMIN — PROPOFOL 50 MG: 10 INJECTION, EMULSION INTRAVENOUS at 08:50

## 2019-02-17 RX ADMIN — PROMETHAZINE HYDROCHLORIDE 12.5 MG: 25 INJECTION INTRAMUSCULAR; INTRAVENOUS at 03:25

## 2019-02-17 RX ADMIN — LIDOCAINE HYDROCHLORIDE 60 MG: 20 INJECTION, SOLUTION INTRAVENOUS at 08:19

## 2019-02-17 RX ADMIN — SODIUM CHLORIDE, SODIUM LACTATE, POTASSIUM CHLORIDE, CALCIUM CHLORIDE AND DEXTROSE MONOHYDRATE 125 ML/HR: 5; 600; 310; 30; 20 INJECTION, SOLUTION INTRAVENOUS at 09:35

## 2019-02-17 RX ADMIN — SODIUM CHLORIDE 8 MG/HR: 9 INJECTION, SOLUTION INTRAVENOUS at 07:00

## 2019-02-17 RX ADMIN — FLUTICASONE PROPIONATE 2 SPRAY: 50 SPRAY, METERED NASAL at 10:24

## 2019-02-17 RX ADMIN — PROPOFOL 50 MG: 10 INJECTION, EMULSION INTRAVENOUS at 08:23

## 2019-02-17 RX ADMIN — PROPOFOL 50 MG: 10 INJECTION, EMULSION INTRAVENOUS at 08:40

## 2019-02-17 RX ADMIN — SODIUM CHLORIDE, POTASSIUM CHLORIDE, SODIUM LACTATE AND CALCIUM CHLORIDE 1000 ML: 600; 310; 30; 20 INJECTION, SOLUTION INTRAVENOUS at 07:50

## 2019-02-17 NOTE — ANESTHESIA PREPROCEDURE EVALUATION
Anesthesia Evaluation     Patient summary reviewed and Nursing notes reviewed   no history of anesthetic complications:  NPO Solid Status: > 8 hours  NPO Liquid Status: > 8 hours           Airway   Mallampati: I  TM distance: >3 FB  Neck ROM: full  no difficulty expected  Dental - normal exam     Pulmonary - normal exam   (+) pulmonary embolism,   Cardiovascular - normal exam    (+) valvular problems/murmurs, angina,       Neuro/Psych  (+) headaches, syncope, numbness, psychiatric history,     GI/Hepatic/Renal/Endo    (+) morbid obesity, GI bleeding,     Musculoskeletal (-) negative ROS    Abdominal    Substance History - negative use     OB/GYN negative ob/gyn ROS         Other - negative ROS                       Anesthesia Plan    ASA 3     MAC     intravenous induction   Anesthetic plan, all risks, benefits, and alternatives have been provided, discussed and informed consent has been obtained with: patient.

## 2019-02-17 NOTE — ANESTHESIA POSTPROCEDURE EVALUATION
Patient: Connie May Felty    Procedure Summary     Date:  02/17/19 Room / Location:  [facility] ENDOSCOPY 2 / [facility] ENDOSCOPY    Anesthesia Start:  0814 Anesthesia Stop:  0903    Procedures:       COLONOSCOPY (N/A )      ESOPHAGOGASTRODUODENOSCOPY (N/A Esophagus) Diagnosis:       Gastrointestinal hemorrhage with melena      Acute blood loss anemia      (Gastrointestinal hemorrhage with melena [K92.1])      (Acute blood loss anemia [D62])    Surgeon:  Sammie Steinberg MD Provider:  Thai Orozco CRNA    Anesthesia Type:  MAC ASA Status:  3          Anesthesia Type: MAC  Last vitals  BP   130/68 (02/17/19 1100)   Temp   97.7 °F (36.5 °C) (02/17/19 1100)   Pulse   85 (02/17/19 1100)   Resp   17 (02/17/19 1100)     SpO2   100 % (02/17/19 0940)     Post Anesthesia Care and Evaluation    Patient location during evaluation: bedside  Patient participation: complete - patient participated  Level of consciousness: awake  Pain score: 0  Pain management: adequate  Airway patency: patent  Anesthetic complications: No anesthetic complications  PONV Status: controlled  Cardiovascular status: acceptable and stable  Respiratory status: acceptable and room air  Hydration status: acceptable

## 2019-02-18 ENCOUNTER — TRANSITIONAL CARE MANAGEMENT TELEPHONE ENCOUNTER (OUTPATIENT)
Dept: INTERNAL MEDICINE | Facility: CLINIC | Age: 59
End: 2019-02-18

## 2019-02-18 NOTE — OUTREACH NOTE
KASH call completed. Please see flow sheet for additional details.  Pt reports she hasn't had stool since DC, so cannot report re hematochezia/melena. Is passing gas. Mild-moderate epigastric abdominal pain continues, but is eating/drinking ok. Taking protonix as ordered. She would like to pursue a capsule EGD - she has appt w/ Dr Robins tomorrow AM and KASH 2/21/19. States she has no other immediate questions or needs.

## 2019-02-22 ENCOUNTER — OFFICE VISIT (OUTPATIENT)
Dept: INTERNAL MEDICINE | Facility: CLINIC | Age: 59
End: 2019-02-22

## 2019-02-22 VITALS
HEART RATE: 73 BPM | DIASTOLIC BLOOD PRESSURE: 73 MMHG | HEIGHT: 63 IN | TEMPERATURE: 97.6 F | SYSTOLIC BLOOD PRESSURE: 134 MMHG | OXYGEN SATURATION: 100 % | BODY MASS INDEX: 21.97 KG/M2 | RESPIRATION RATE: 15 BRPM | WEIGHT: 124 LBS

## 2019-02-22 DIAGNOSIS — K92.1 MELENA: Primary | ICD-10-CM

## 2019-02-22 DIAGNOSIS — R03.0 ELEVATED BLOOD PRESSURE READING: ICD-10-CM

## 2019-02-22 PROCEDURE — 99214 OFFICE O/P EST MOD 30 MIN: CPT | Performed by: NURSE PRACTITIONER

## 2019-02-22 RX ORDER — GLYCERIN ADULT
1 SUPPOSITORY, RECTAL RECTAL DAILY
Qty: 1 EACH | Refills: 0 | Status: SHIPPED | OUTPATIENT
Start: 2019-02-22 | End: 2020-06-22
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
## 2019-03-03 ENCOUNTER — APPOINTMENT (OUTPATIENT)
Dept: CT IMAGING | Facility: HOSPITAL | Age: 59
End: 2019-03-03

## 2019-03-03 ENCOUNTER — HOSPITAL ENCOUNTER (EMERGENCY)
Facility: HOSPITAL | Age: 59
Discharge: HOME | End: 2019-03-03
Attending: EMERGENCY MEDICINE | Admitting: EMERGENCY MEDICINE

## 2019-03-03 VITALS
DIASTOLIC BLOOD PRESSURE: 81 MMHG | HEIGHT: 63 IN | TEMPERATURE: 98.2 F | OXYGEN SATURATION: 100 % | SYSTOLIC BLOOD PRESSURE: 128 MMHG | BODY MASS INDEX: 21.09 KG/M2 | RESPIRATION RATE: 16 BRPM | HEART RATE: 69 BPM | WEIGHT: 119 LBS

## 2019-03-03 DIAGNOSIS — K62.5 RECTAL BLEEDING: Primary | ICD-10-CM

## 2019-03-03 DIAGNOSIS — N39.0 ACUTE LOWER UTI (URINARY TRACT INFECTION): ICD-10-CM

## 2019-03-03 LAB
ALBUMIN SERPL-MCNC: 4.1 G/DL (ref 3.5–5)
ALBUMIN/GLOB SERPL: 1.6 G/DL (ref 1–2)
ALP SERPL-CCNC: 94 U/L (ref 38–126)
ALT SERPL W P-5'-P-CCNC: 17 U/L (ref 13–69)
ANION GAP SERPL CALCULATED.3IONS-SCNC: 10.7 MMOL/L (ref 10–20)
AST SERPL-CCNC: 25 U/L (ref 15–46)
BACTERIA UR QL AUTO: ABNORMAL /HPF
BASOPHILS # BLD AUTO: 0.02 10*3/MM3 (ref 0–0.2)
BASOPHILS NFR BLD AUTO: 0.5 % (ref 0–2.5)
BILIRUB SERPL-MCNC: 0.4 MG/DL (ref 0.2–1.3)
BILIRUB UR QL STRIP: NEGATIVE
BUN BLD-MCNC: 9 MG/DL (ref 7–20)
BUN/CREAT SERPL: 11.3 (ref 7.1–23.5)
CALCIUM SPEC-SCNC: 9.4 MG/DL (ref 8.4–10.2)
CHLORIDE SERPL-SCNC: 107 MMOL/L (ref 98–107)
CLARITY UR: CLEAR
CO2 SERPL-SCNC: 28 MMOL/L (ref 26–30)
COLOR UR: YELLOW
CREAT BLD-MCNC: 0.8 MG/DL (ref 0.6–1.3)
DEPRECATED RDW RBC AUTO: 43.8 FL (ref 37–54)
EOSINOPHIL # BLD AUTO: 0.1 10*3/MM3 (ref 0–0.7)
EOSINOPHIL NFR BLD AUTO: 2.3 % (ref 0–7)
ERYTHROCYTE [DISTWIDTH] IN BLOOD BY AUTOMATED COUNT: 13.2 % (ref 11.5–14.5)
GFR SERPL CREATININE-BSD FRML MDRD: 74 ML/MIN/1.73
GLOBULIN UR ELPH-MCNC: 2.6 GM/DL
GLUCOSE BLD-MCNC: 75 MG/DL (ref 74–98)
GLUCOSE UR STRIP-MCNC: NEGATIVE MG/DL
HCT VFR BLD AUTO: 40.8 % (ref 37–47)
HEMOCCULT STL QL: NEGATIVE
HGB BLD-MCNC: 13.3 G/DL (ref 12–16)
HGB UR QL STRIP.AUTO: NEGATIVE
HOLD SPECIMEN: NORMAL
HYALINE CASTS UR QL AUTO: ABNORMAL /LPF
IMM GRANULOCYTES # BLD AUTO: 0.01 10*3/MM3 (ref 0–0.06)
IMM GRANULOCYTES NFR BLD AUTO: 0.2 % (ref 0–0.6)
KETONES UR QL STRIP: NEGATIVE
LEUKOCYTE ESTERASE UR QL STRIP.AUTO: ABNORMAL
LIPASE SERPL-CCNC: 145 U/L (ref 23–300)
LYMPHOCYTES # BLD AUTO: 1.96 10*3/MM3 (ref 0.6–3.4)
LYMPHOCYTES NFR BLD AUTO: 44.5 % (ref 10–50)
MCH RBC QN AUTO: 29.6 PG (ref 27–31)
MCHC RBC AUTO-ENTMCNC: 32.6 G/DL (ref 30–37)
MCV RBC AUTO: 90.9 FL (ref 81–99)
MONOCYTES # BLD AUTO: 0.34 10*3/MM3 (ref 0–0.9)
MONOCYTES NFR BLD AUTO: 7.7 % (ref 0–12)
NEUTROPHILS # BLD AUTO: 1.97 10*3/MM3 (ref 2–6.9)
NEUTROPHILS NFR BLD AUTO: 44.8 % (ref 37–80)
NITRITE UR QL STRIP: NEGATIVE
NRBC BLD AUTO-RTO: 0 /100 WBC (ref 0–0)
PH UR STRIP.AUTO: 7 [PH] (ref 5–8)
PLATELET # BLD AUTO: 233 10*3/MM3 (ref 130–400)
PMV BLD AUTO: 10.2 FL (ref 6–12)
POTASSIUM BLD-SCNC: 3.7 MMOL/L (ref 3.5–5.1)
PROT SERPL-MCNC: 6.7 G/DL (ref 6.3–8.2)
PROT UR QL STRIP: NEGATIVE
RBC # BLD AUTO: 4.49 10*6/MM3 (ref 4.2–5.4)
RBC # UR: ABNORMAL /HPF
REF LAB TEST METHOD: ABNORMAL
SODIUM BLD-SCNC: 142 MMOL/L (ref 137–145)
SP GR UR STRIP: 1.01 (ref 1–1.03)
SQUAMOUS #/AREA URNS HPF: ABNORMAL /HPF
UROBILINOGEN UR QL STRIP: ABNORMAL
WBC NRBC COR # BLD: 4.4 10*3/MM3 (ref 4.8–10.8)
WBC UR QL AUTO: ABNORMAL /HPF
WHOLE BLOOD HOLD SPECIMEN: NORMAL
WHOLE BLOOD HOLD SPECIMEN: NORMAL

## 2019-03-03 PROCEDURE — 99284 EMERGENCY DEPT VISIT MOD MDM: CPT

## 2019-03-03 PROCEDURE — 80053 COMPREHEN METABOLIC PANEL: CPT | Performed by: EMERGENCY MEDICINE

## 2019-03-03 PROCEDURE — 82272 OCCULT BLD FECES 1-3 TESTS: CPT | Performed by: PHYSICIAN ASSISTANT

## 2019-03-03 PROCEDURE — 74177 CT ABD & PELVIS W/CONTRAST: CPT

## 2019-03-03 PROCEDURE — 25010000002 IOPAMIDOL 61 % SOLUTION: Performed by: EMERGENCY MEDICINE

## 2019-03-03 PROCEDURE — 83690 ASSAY OF LIPASE: CPT | Performed by: EMERGENCY MEDICINE

## 2019-03-03 PROCEDURE — 96360 HYDRATION IV INFUSION INIT: CPT

## 2019-03-03 PROCEDURE — 85025 COMPLETE CBC W/AUTO DIFF WBC: CPT | Performed by: EMERGENCY MEDICINE

## 2019-03-03 PROCEDURE — 81001 URINALYSIS AUTO W/SCOPE: CPT | Performed by: EMERGENCY MEDICINE

## 2019-03-03 RX ORDER — CEFUROXIME AXETIL 250 MG/1
250 TABLET ORAL 2 TIMES DAILY
Qty: 10 TABLET | Refills: 0 | Status: SHIPPED | OUTPATIENT
Start: 2019-03-03 | End: 2020-06-22

## 2019-03-03 RX ORDER — SODIUM CHLORIDE 9 MG/ML
250 INJECTION, SOLUTION INTRAVENOUS CONTINUOUS
Status: DISCONTINUED | OUTPATIENT
Start: 2019-03-03 | End: 2019-03-03 | Stop reason: HOSPADM

## 2019-03-03 RX ORDER — SODIUM CHLORIDE 0.9 % (FLUSH) 0.9 %
10 SYRINGE (ML) INJECTION AS NEEDED
Status: DISCONTINUED | OUTPATIENT
Start: 2019-03-03 | End: 2019-03-03 | Stop reason: HOSPADM

## 2019-03-03 RX ADMIN — SODIUM CHLORIDE 250 ML/HR: 9 INJECTION, SOLUTION INTRAVENOUS at 10:51

## 2019-03-03 RX ADMIN — IOPAMIDOL 100 ML: 612 INJECTION, SOLUTION INTRAVENOUS at 12:18

## 2019-03-05 ENCOUNTER — OFFICE VISIT (OUTPATIENT)
Dept: SURGERY | Facility: CLINIC | Age: 59
End: 2019-03-05

## 2019-03-05 VITALS
WEIGHT: 119 LBS | SYSTOLIC BLOOD PRESSURE: 110 MMHG | BODY MASS INDEX: 21.09 KG/M2 | DIASTOLIC BLOOD PRESSURE: 70 MMHG | HEIGHT: 63 IN | HEART RATE: 74 BPM | TEMPERATURE: 98.8 F | OXYGEN SATURATION: 98 %

## 2019-03-05 DIAGNOSIS — K64.8 INTERNAL HEMORRHOIDS: Primary | ICD-10-CM

## 2019-03-05 PROCEDURE — 99213 OFFICE O/P EST LOW 20 MIN: CPT | Performed by: SURGERY

## 2019-03-05 NOTE — PROGRESS NOTES
Subjective   Connie May Felty is a 58 y.o. female.   Chief Complaint   Patient presents with   • Follow-up     follow up rectal bleed         History of Present Illness   Ms. Felty returns to the office today after recently being seen in the emergency department for a single episode of bright red rectal bleeding.  This occurred on 3/5/2019.  She had a CT scan of the abdomen and pelvis performed at that time which was negative for an acute intra-abdominal process.  Recall that she was hospitalized in February 2019 with similar complaints, and EGD and colonoscopy were both essentially negative at that time.  She did have internal hemorrhoids noted on colonoscopy.  She continues to complain of intermittent nausea and upper abdominal tenderness.  She reports that her stools are loose, and occasionally dark in color.  She denies a family history of colon cancer.  She does not have any anal or rectal pain.  She denies vomiting.  She denies repeat episodes of rectal bleeding since being evaluated in the emergency department.    The following portions of the patient's history were reviewed and updated as appropriate: allergies, current medications, past family history, past medical history, past social history, past surgical history and problem list.     Patient Active Problem List   Diagnosis   • Nerve pain   • Chronic pain syndrome   • Osteoporosis   • Vitamin B 12 deficiency   • S/P gastric bypass   • History of pulmonary embolism   • Normal body mass index (BMI)   • Migraine without status migrainosus, not intractable   • Abnormal stress test   • Hyperkalemia   • Syncope   • Anginal equivalent (CMS/HCC)   • Murmur, cardiac   • Cervical radiculopathy   • Pain in joint of left shoulder   • Insomnia   • Seronegative rheumatoid arthritis (CMS/HCC)   • Gastrointestinal hemorrhage   • Acute blood loss anemia   • Gastrointestinal hemorrhage with melena       Past Medical History:   Diagnosis Date   • Anemia     iron deficiency  per records   • Anxiety    • B12 deficiency    • Compression fracture of spine (CMS/HCC)    • Elevated bilirubin 02/2013   • Fractures    • Inflammation of sacroiliac joint (CMS/HCC) 8/5/2009   • Intussusception of small bowel (CMS/HCC) 10/29/2013   • Migraine    • Mitral incompetence    • Neuropathy    • Osteoporosis     Has had compression fracture.   • Pelvic fracture (CMS/HCC) 2014    Slipped on ice. Surgical repair [place], [place]. [facility].   • Postmenopausal     Age 42   • Pulmonary embolism (CMS/HCC) 10/2013   • Rheumatoid arthritis (CMS/HCC)    • Sepsis (CMS/HCC) 10/2013   • Small bowel perforation (CMS/HCC) 2014   • Vitamin D deficiency        Past Surgical History:   Procedure Laterality Date   • BUNIONECTOMY Right    • CERVICAL FUSION  2007    C4-C7   • COLONOSCOPY     • COLONOSCOPY N/A 2/17/2019    Procedure: COLONOSCOPY;  Surgeon: Sammie Steinberg MD;  Location: [facility] ENDOSCOPY;  Service: General   • ENDOSCOPY N/A 2/17/2019    Procedure: ESOPHAGOGASTRODUODENOSCOPY;  Surgeon: Sammie Steinberg MD;  Location: [facility] ENDOSCOPY;  Service: General   • EXPLORATORY LAPAROTOMY W/ BOWEL RESECTION  2013    for intussussception of entero-enterostomy with associated perforation; done at [facility]   • GASTRIC BYPASS  2005    Dr. Pedroza in [facility]   • KNEE ARTHROSCOPY     • NECK SURGERY      C 4-7   • PELVIC FRACTURE SURGERY  2014    s/p mechanical fall on ice; done at [facility] in [place]   • SHOULDER SURGERY     • TOE AMPUTATION Left 2005    Toe #2 and #3 due to trauma   • TOTAL ABDOMINAL HYSTERECTOMY WITH SALPINGO OOPHORECTOMY  2005   • TOTAL HIP ARTHROPLASTY Left 2005, 2007       Medications:     Current Outpatient Medications:   •  Blood Pressure Monitoring (BLOOD PRESSURE MONITOR/ARM) device, 1 Device Daily., Disp: 1 each, Rfl: 0  •  Calcium Carb-Cholecalciferol (CALCIUM-VITAMIN D) 600-400 MG-UNIT tablet, Take 1 tablet by mouth 2 (Two) Times a Day., Disp: 180 tablet, Rfl: 3  •   cefuroxime (CEFTIN) 250 MG tablet, Take 1 tablet by mouth 2 (Two) Times a Day., Disp: 10 tablet, Rfl: 0  •  Cholecalciferol (VITAMIN D3) 2000 units capsule, Take 1 capsule by mouth Daily., Disp: 90 capsule, Rfl: 3  •  Cyanocobalamin 1000 MCG/ML kit, Inject 1,000 mcg into the appropriate muscle as directed by prescriber Every 30 (Thirty) Days., Disp: 3 each, Rfl: 3  •  fluticasone (FLONASE) 50 MCG/ACT nasal spray, 2 sprays into each nostril Daily., Disp: 1 bottle, Rfl: 1  •  gabapentin (NEURONTIN) 800 MG tablet, Take 1 tablet by mouth 4 (Four) Times a Day., Disp: 120 tablet, Rfl: 2  •  Insulin Pen Needle (PEN NEEDLES) 30G X 8 MM misc, 1 each 1 (One) Time Per Week., Disp: 90 each, Rfl: 3  •  pantoprazole (PROTONIX) 40 MG EC tablet, Take 1 tablet by mouth Daily., Disp: 30 tablet, Rfl: 0  •  sucralfate (CARAFATE) 1 g tablet, TAKE 1 TABLET 4 TIMES DAILY, BEFORE MEALS AND AT BEDTIME, Disp: 100 tablet, Rfl: 1  •  Syringe, Disposable, 1 ML misc, Use to inject vitamin B12 every 28 days, Disp: 25 each, Rfl: 0  •  teriparatide (FORTEO) 600 MCG/2.4ML injection, Inject 0.08 mL under the skin into the appropriate area as directed Daily., Disp: 2.4 mL, Rfl: 11  •  topiramate (TOPAMAX) 100 MG tablet, Take 1 tablet by mouth 2 (Two) Times a Day., Disp: 60 tablet, Rfl: 5    Allergies:   Allergies   Allergen Reactions   • Aspirin Other (See Comments)     Due to gastric bypass   • Diclofenac Sodium Swelling   • Nsaids Other (See Comments)     D/t hx of gastric bypass  Gastric bypass   • Lidoderm [Lidocaine] Other (See Comments) and Myalgia     Visual changes, numbness, Pt states she can have injected lidocaine  Per records, visual changes and numbness         Family History   Problem Relation Age of Onset   • Arthritis Mother    • Diabetes Mother    • Heart disease Mother         CABG   • Hypertension Mother    • Asthma Mother    • Ovarian cancer Sister    • Lupus Sister    • Lung disease Sister    • Heart disease Sister         3 CABG  "  • Diabetes Father    • Heart disease Father    • Heart disease Brother         5 CABG   • Heart attack Brother    • Diabetes Sister    • Heart disease Sister        Social History     Socioeconomic History   • Marital status:      Spouse name: Not on file   • Number of children: Not on file   • Years of education: Not on file   • Highest education level: Not on file   Tobacco Use   • Smoking status: Never Smoker   • Smokeless tobacco: Never Used   Substance and Sexual Activity   • Alcohol use: No   • Drug use: No   • Sexual activity: Defer         Review of Systems   Constitutional: Negative for chills, fever and unexpected weight change.   HENT: Negative for hearing loss, trouble swallowing and voice change.    Eyes: Negative for visual disturbance.   Respiratory: Negative for apnea, cough, chest tightness, shortness of breath and wheezing.    Cardiovascular: Negative for chest pain, palpitations and leg swelling.   Gastrointestinal: Positive for abdominal pain (upper abdominal tenderness), blood in stool, diarrhea and nausea. Negative for abdominal distention, anal bleeding, constipation, rectal pain and vomiting.   Endocrine: Negative for cold intolerance and heat intolerance.   Genitourinary: Negative for difficulty urinating, dysuria and flank pain.   Musculoskeletal: Negative for back pain and gait problem.   Skin: Negative for color change, rash and wound.   Neurological: Negative for dizziness, syncope, speech difficulty, weakness, light-headedness, numbness and headaches.   Hematological: Negative for adenopathy. Does not bruise/bleed easily.   Psychiatric/Behavioral: Negative for confusion. The patient is not nervous/anxious.        Objective    /70   Pulse 74   Temp 98.8 °F (37.1 °C)   Ht 160 cm (63\")   Wt 54 kg (119 lb)   SpO2 98%   BMI 21.08 kg/m²     Physical Exam   Constitutional: She is oriented to person, place, and time. She appears well-developed and well-nourished.   HENT: "   Head: Normocephalic and atraumatic.   Eyes: No scleral icterus.   Neck: Neck supple.   Cardiovascular: Regular rhythm.   Pulmonary/Chest: Effort normal.   Abdominal: Soft. She exhibits no distension. There is no tenderness.   Neurological: She is alert and oriented to person, place, and time.   Skin: Skin is warm and dry.   Psychiatric: She has a normal mood and affect. Her behavior is normal.       Assessment/Plan   Debra was seen today for follow-up.    Diagnoses and all orders for this visit:    Internal hemorrhoids  -     hydrocortisone (PROCTOSOL HC) 2.5 % rectal cream; Insert  into the rectum 2 (Two) Times a Day As Needed for Hemorrhoids.    I had a long discussion with the patient regarding her recent and hospital, and emergency department workup.  I suspect that her bleeding is mostly hemorrhoidal in nature, and I have discussed this with her in detail.  We discussed basic hemorrhoidal care, and I have prescribed hydrocortisone suppositories/cream to be used as needed.  I have cautioned her against straining to have bowel movements, and discussed with her the importance of water intake, and keeping her stools soft.  I discussed with her that if she should have recurrent bleeding, perhaps, a tagged red blood cell scan would be helpful in the future given her surgically altered upper GI anatomy.  She may have bleeding somewhere in the upper GI tract that is inaccessible by endoscopy, however, I am confident she does not have a source of bleeding currently in either the visualized portions of the upper GI tract, or the colon.

## 2019-08-09 ENCOUNTER — TELEPHONE (OUTPATIENT)
Dept: CARDIOLOGY | Facility: CLINIC | Age: 59
End: 2019-08-09

## 2019-12-17 RX ORDER — ACETAMINOPHEN 160 MG
2000 TABLET,DISINTEGRATING ORAL DAILY
Qty: 30 CAPSULE | Refills: 7 | Status: SHIPPED | OUTPATIENT
Start: 2019-12-17 | End: 2020-09-22 | Stop reason: SDUPTHER

## 2020-06-22 ENCOUNTER — OFFICE VISIT (OUTPATIENT)
Dept: INTERNAL MEDICINE | Facility: CLINIC | Age: 60
End: 2020-06-22

## 2020-06-22 VITALS
SYSTOLIC BLOOD PRESSURE: 118 MMHG | TEMPERATURE: 98.2 F | WEIGHT: 125 LBS | DIASTOLIC BLOOD PRESSURE: 62 MMHG | OXYGEN SATURATION: 99 % | BODY MASS INDEX: 22.15 KG/M2 | HEIGHT: 63 IN | HEART RATE: 69 BPM

## 2020-06-22 DIAGNOSIS — G43.909 MIGRAINE WITHOUT STATUS MIGRAINOSUS, NOT INTRACTABLE, UNSPECIFIED MIGRAINE TYPE: ICD-10-CM

## 2020-06-22 DIAGNOSIS — Z79.899 HIGH RISK MEDICATION USE: ICD-10-CM

## 2020-06-22 DIAGNOSIS — G89.4 CHRONIC PAIN SYNDROME: ICD-10-CM

## 2020-06-22 DIAGNOSIS — M79.2 NERVE PAIN: ICD-10-CM

## 2020-06-22 DIAGNOSIS — M80.00XD OSTEOPOROSIS WITH CURRENT PATHOLOGICAL FRACTURE WITH ROUTINE HEALING, UNSPECIFIED OSTEOPOROSIS TYPE, SUBSEQUENT ENCOUNTER: ICD-10-CM

## 2020-06-22 DIAGNOSIS — E53.8 VITAMIN B 12 DEFICIENCY: Primary | ICD-10-CM

## 2020-06-22 DIAGNOSIS — Z12.39 SCREENING FOR MALIGNANT NEOPLASM OF BREAST: ICD-10-CM

## 2020-06-22 DIAGNOSIS — K21.9 GASTROESOPHAGEAL REFLUX DISEASE, ESOPHAGITIS PRESENCE NOT SPECIFIED: ICD-10-CM

## 2020-06-22 PROCEDURE — 99214 OFFICE O/P EST MOD 30 MIN: CPT | Performed by: NURSE PRACTITIONER

## 2020-06-22 RX ORDER — GABAPENTIN 800 MG/1
800 TABLET ORAL 4 TIMES DAILY
Qty: 120 TABLET | Refills: 2 | Status: SHIPPED | OUTPATIENT
Start: 2020-06-22 | End: 2020-09-22 | Stop reason: SDUPTHER

## 2020-06-22 RX ORDER — SUCRALFATE 1 G/1
1 TABLET ORAL 4 TIMES DAILY
Qty: 120 TABLET | Refills: 2 | Status: SHIPPED | OUTPATIENT
Start: 2020-06-22 | End: 2020-09-22 | Stop reason: SDUPTHER

## 2020-06-22 RX ORDER — TOPIRAMATE 100 MG/1
100 TABLET, FILM COATED ORAL 2 TIMES DAILY
Qty: 60 TABLET | Refills: 5 | Status: SHIPPED | OUTPATIENT
Start: 2020-06-22 | End: 2020-09-11 | Stop reason: SDUPTHER

## 2020-06-22 NOTE — PROGRESS NOTES
"Date: 2020    Name: Connie May Felty  : 1960    Chief Complaint:   Chief Complaint   Patient presents with   • Med Refill       HPI:  Connie May Felty is a 59 y.o. female presents for medication refills.  She has chronic left shoulder pain, frozen shoulder.  She has been receiving injections in left shoulder from Dr Kip Roa in [facility] every month.  She is recently moved back to [facility] after living in [place] for 2 to 3 months.  Her sister lives in [facility], and she plans to visit her when she goes to his clinic for future injections.  Patient states Dr. Roa does not prescribe any oral medication.   Patient is also requesting refills of Neurontin 800 mg, cyanocobalamin injection kit, Forteo, topamax, as well as a \"little white pill I used to take 4 times a day for my stomach.\" She has a h/o gastric bypass, has been having more reflux than normal for the past few weeks.    H/O of migraines.  States when she is taking topamax, she does not have headaches.     History: The following portions of the patient's history were reviewed and updated as appropriate: allergies, current medications, past medical history, family history, surgical history, social history and problem list.      ROS:  Review of Systems   Constitutional: Negative.    Respiratory: Negative.    Cardiovascular: Negative.    Musculoskeletal: Negative for gait problem, neck pain and neck stiffness.   Neurological: Negative.    Hematological: Does not bruise/bleed easily.       VS:  Vitals:    20 1113   BP: 118/62   Pulse: 69   Temp: 98.2 °F (36.8 °C)   TempSrc: Temporal   SpO2: 99%   Weight: 56.7 kg (125 lb)   Height: 160 cm (63\")     Body mass index is 22.14 kg/m².  PE:  Physical Exam   Constitutional: She is oriented to person, place, and time. She appears well-developed and well-nourished. No distress.   HENT:   Head: Normocephalic.   Right Ear: External ear normal.   Left Ear: External ear normal.   Eyes: " Pupils are equal, round, and reactive to light. Conjunctivae are normal.   Neck: Normal range of motion. Neck supple.   Cardiovascular: Normal rate, regular rhythm, normal heart sounds and intact distal pulses.   Pulmonary/Chest: Effort normal and breath sounds normal.   Abdominal: Soft. She exhibits no distension. There is no tenderness.   Musculoskeletal:   Left shoulder limited ROM, slightly tender to touch.   Neurological: She is alert and oriented to person, place, and time.   Skin: Skin is warm. Capillary refill takes less than 2 seconds.   Psychiatric: She has a normal mood and affect. Her behavior is normal.       Assessment/Plan:  Debra was seen today for med refill.    Diagnoses and all orders for this visit:    Vitamin B 12 deficiency  -     Cyanocobalamin 1000 MCG/ML kit; Inject 1,000 mcg into the appropriate muscle as directed by prescriber Every 30 (Thirty) Days.    Osteoporosis with current pathological fracture with routine healing, unspecified osteoporosis type, subsequent encounter  -     Teriparatide, Recombinant, (FORTEO) 600 MCG/2.4ML injection; Inject 0.08 mL under the skin into the appropriate area as directed Daily for 90 days.  - Continue taking calcium supplements w/vitamin d  - Increase weight bearing exercise    Chronic pain syndrome  -     topiramate (TOPAMAX) 100 MG tablet; Take 1 tablet by mouth 2 (Two) Times a Day.  -     gabapentin (NEURONTIN) 800 MG tablet; Take 1 tablet by mouth 4 (Four) Times a Day.    Migraine without status migrainosus, not intractable, unspecified migraine type  -     topiramate (TOPAMAX) 100 MG tablet; Take 1 tablet by mouth 2 (Two) Times a Day.    Screening for malignant neoplasm of breast  -     Mammo Screening Digital Tomosynthesis Bilateral With CAD; Future    High risk medication use  -     Urine Drug Screen - Urine, Clean Catch; Future    Nerve pain  -     gabapentin (NEURONTIN) 800 MG tablet; Take 1 tablet by mouth 4 (Four) Times a Day.    GERD,  esophagitis presence not specified  -     sucralfate (Carafate) 1 g tablet; Take 1 tablet by mouth 4 (Four) Times a Day.        Return in about 3 months (around 9/22/2020) for Annual.

## 2020-06-23 ENCOUNTER — TELEPHONE (OUTPATIENT)
Dept: INTERNAL MEDICINE | Facility: CLINIC | Age: 60
End: 2020-06-23

## 2020-06-23 DIAGNOSIS — G89.4 CHRONIC PAIN SYNDROME: Primary | ICD-10-CM

## 2020-06-23 NOTE — TELEPHONE ENCOUNTER
SHE IS CURRENTLY SEEING PAIN MANAGEMENT [facility] SIMPSON. HE NO LONGER TAKES HER INSURANCE SO SHE WILL NEED A NEW REFERRAL TO ANOTHER PAIN MANAGEMENT AS SOON AS POSSIBLE.

## 2020-06-26 DIAGNOSIS — Z79.899 HIGH RISK MEDICATION USE: ICD-10-CM

## 2020-07-13 ENCOUNTER — APPOINTMENT (OUTPATIENT)
Dept: MAMMOGRAPHY | Facility: HOSPITAL | Age: 60
End: 2020-07-13

## 2020-08-12 ENCOUNTER — HOSPITAL ENCOUNTER (OUTPATIENT)
Dept: MAMMOGRAPHY | Facility: HOSPITAL | Age: 60
Discharge: HOME | End: 2020-08-12
Admitting: NURSE PRACTITIONER

## 2020-08-12 DIAGNOSIS — Z12.39 SCREENING FOR MALIGNANT NEOPLASM OF BREAST: ICD-10-CM

## 2020-08-12 PROCEDURE — 77067 SCR MAMMO BI INCL CAD: CPT

## 2020-08-12 PROCEDURE — 77063 BREAST TOMOSYNTHESIS BI: CPT

## 2020-09-02 ENCOUNTER — TELEPHONE (OUTPATIENT)
Dept: INTERNAL MEDICINE | Facility: CLINIC | Age: 60
End: 2020-09-02

## 2020-09-02 DIAGNOSIS — M81.8 OTHER OSTEOPOROSIS WITHOUT CURRENT PATHOLOGICAL FRACTURE: ICD-10-CM

## 2020-09-02 RX ORDER — ALENDRONATE SODIUM 70 MG/1
70 TABLET ORAL
Qty: 12 TABLET | Refills: 3 | Status: SHIPPED | OUTPATIENT
Start: 2020-09-02 | End: 2020-09-22 | Stop reason: SDUPTHER

## 2020-09-02 NOTE — TELEPHONE ENCOUNTER
Patient is calling because he fosamax was sent to [facility] and it needs to go to [facility] in [place]. Please advise and resend to correct pharmacy.    [facility] MURPHY

## 2020-09-08 ENCOUNTER — APPOINTMENT (OUTPATIENT)
Dept: BONE DENSITY | Facility: HOSPITAL | Age: 60
End: 2020-09-08

## 2020-09-08 PROCEDURE — 77080 DXA BONE DENSITY AXIAL: CPT

## 2020-09-11 DIAGNOSIS — G43.909 MIGRAINE WITHOUT STATUS MIGRAINOSUS, NOT INTRACTABLE, UNSPECIFIED MIGRAINE TYPE: ICD-10-CM

## 2020-09-11 DIAGNOSIS — G89.4 CHRONIC PAIN SYNDROME: ICD-10-CM

## 2020-09-11 RX ORDER — TOPIRAMATE 100 MG/1
100 TABLET, FILM COATED ORAL 2 TIMES DAILY
Qty: 60 TABLET | Refills: 5 | Status: SHIPPED | OUTPATIENT
Start: 2020-09-11 | End: 2020-09-22 | Stop reason: SDUPTHER

## 2020-09-11 NOTE — TELEPHONE ENCOUNTER
Caller: Felty, Connie May    Relationship: Self    Best call back number: [phone]   Medication needed:   Requested Prescriptions     Pending Prescriptions Disp Refills   • topiramate (TOPAMAX) 100 MG tablet 60 tablet 5     Sig: Take 1 tablet by mouth 2 (Two) Times a Day.       When do you need the refill by: ASAP    What details did the patient provide when requesting the medication:     Does the patient have less than a 3 day supply:  [] Yes  [x] No    What is the patient's preferred pharmacy:      [address] [phone] [facility] [phone]

## 2020-09-14 ENCOUNTER — TELEPHONE (OUTPATIENT)
Dept: INTERNAL MEDICINE | Facility: CLINIC | Age: 60
End: 2020-09-14

## 2020-09-14 NOTE — TELEPHONE ENCOUNTER
Spoke with patient and advised there were abnormal findings on her recent Dexa scan report. Will discuss at appt on 9/22, until then. Continue Fosamax. Pt understands.

## 2020-09-22 ENCOUNTER — TELEPHONE (OUTPATIENT)
Dept: INTERNAL MEDICINE | Facility: CLINIC | Age: 60
End: 2020-09-22

## 2020-09-22 ENCOUNTER — OFFICE VISIT (OUTPATIENT)
Dept: INTERNAL MEDICINE | Facility: CLINIC | Age: 60
End: 2020-09-22

## 2020-09-22 VITALS
TEMPERATURE: 97.5 F | OXYGEN SATURATION: 99 % | DIASTOLIC BLOOD PRESSURE: 70 MMHG | WEIGHT: 126.5 LBS | SYSTOLIC BLOOD PRESSURE: 118 MMHG | HEIGHT: 63 IN | RESPIRATION RATE: 18 BRPM | BODY MASS INDEX: 22.41 KG/M2 | HEART RATE: 77 BPM

## 2020-09-22 DIAGNOSIS — M79.2 NERVE PAIN: ICD-10-CM

## 2020-09-22 DIAGNOSIS — G43.909 MIGRAINE WITHOUT STATUS MIGRAINOSUS, NOT INTRACTABLE, UNSPECIFIED MIGRAINE TYPE: ICD-10-CM

## 2020-09-22 DIAGNOSIS — R53.83 FATIGUE, UNSPECIFIED TYPE: ICD-10-CM

## 2020-09-22 DIAGNOSIS — Z13.220 SCREENING, LIPID: ICD-10-CM

## 2020-09-22 DIAGNOSIS — K21.9 GASTROESOPHAGEAL REFLUX DISEASE, ESOPHAGITIS PRESENCE NOT SPECIFIED: ICD-10-CM

## 2020-09-22 DIAGNOSIS — Z00.00 ANNUAL PHYSICAL EXAM: Primary | ICD-10-CM

## 2020-09-22 DIAGNOSIS — M81.8 OTHER OSTEOPOROSIS WITHOUT CURRENT PATHOLOGICAL FRACTURE: ICD-10-CM

## 2020-09-22 DIAGNOSIS — Z98.84 S/P GASTRIC BYPASS: Chronic | ICD-10-CM

## 2020-09-22 DIAGNOSIS — Z23 NEED FOR INFLUENZA VACCINATION: ICD-10-CM

## 2020-09-22 DIAGNOSIS — E53.8 VITAMIN B 12 DEFICIENCY: ICD-10-CM

## 2020-09-22 DIAGNOSIS — J30.89 ENVIRONMENTAL AND SEASONAL ALLERGIES: ICD-10-CM

## 2020-09-22 DIAGNOSIS — G89.4 CHRONIC PAIN SYNDROME: ICD-10-CM

## 2020-09-22 PROCEDURE — 90471 IMMUNIZATION ADMIN: CPT | Performed by: FAMILY MEDICINE

## 2020-09-22 PROCEDURE — 99396 PREV VISIT EST AGE 40-64: CPT | Performed by: FAMILY MEDICINE

## 2020-09-22 PROCEDURE — 90686 IIV4 VACC NO PRSV 0.5 ML IM: CPT | Performed by: FAMILY MEDICINE

## 2020-09-22 RX ORDER — GABAPENTIN 800 MG/1
800 TABLET ORAL 4 TIMES DAILY
Qty: 360 TABLET | Refills: 1 | Status: SHIPPED | OUTPATIENT
Start: 2020-09-22 | End: 2020-09-22

## 2020-09-22 RX ORDER — SUCRALFATE 1 G/1
1 TABLET ORAL 4 TIMES DAILY
Qty: 120 TABLET | Refills: 2 | Status: SHIPPED | OUTPATIENT
Start: 2020-09-22 | End: 2020-09-28 | Stop reason: SDUPTHER

## 2020-09-22 RX ORDER — ALENDRONATE SODIUM 70 MG/1
70 TABLET ORAL
Qty: 12 TABLET | Refills: 3 | Status: SHIPPED | OUTPATIENT
Start: 2020-09-22 | End: 2021-06-02

## 2020-09-22 RX ORDER — ACETAMINOPHEN 160 MG
2000 TABLET,DISINTEGRATING ORAL DAILY
Qty: 90 CAPSULE | Refills: 3 | Status: SHIPPED | OUTPATIENT
Start: 2020-09-22 | End: 2021-02-04 | Stop reason: SDUPTHER

## 2020-09-22 RX ORDER — OXYCODONE HYDROCHLORIDE AND ACETAMINOPHEN 5; 325 MG/1; MG/1
TABLET ORAL
COMMUNITY
Start: 2020-09-17 | End: 2021-01-11

## 2020-09-22 RX ORDER — TOPIRAMATE 100 MG/1
100 TABLET, FILM COATED ORAL 2 TIMES DAILY
Qty: 180 TABLET | Refills: 3 | Status: SHIPPED | OUTPATIENT
Start: 2020-09-22 | End: 2020-09-28 | Stop reason: SDUPTHER

## 2020-09-22 RX ORDER — GABAPENTIN 800 MG/1
800 TABLET ORAL 4 TIMES DAILY
Qty: 360 TABLET | Refills: 1 | Status: SHIPPED | COMMUNITY
Start: 2020-09-22 | End: 2021-01-11 | Stop reason: SDUPTHER

## 2020-09-22 RX ORDER — FLUTICASONE PROPIONATE 50 MCG
2 SPRAY, SUSPENSION (ML) NASAL DAILY
Qty: 3 BOTTLE | Refills: 3 | Status: SHIPPED | OUTPATIENT
Start: 2020-09-22 | End: 2020-09-28 | Stop reason: SDUPTHER

## 2020-09-22 NOTE — TELEPHONE ENCOUNTER
Reviewed DELMER after visit.    She requested gabapentin refill, sent.    Please call pillpack to cancel it.    She has been getting gabapentin from an outside provider, just filled 9/5.     Based on this I can no longer prescribe, she has to get from the other provider (pain clinic?)

## 2020-09-22 NOTE — PROGRESS NOTES
09/22/2020  Chief Complaint   Patient presents with   • Annual Exam     Physical   • Results     Discuss Dexa scan results.        Connie May Felty is here for her annual preventive exam. History per MA reviewed.       Connie May Felty has the following medical issues:  Patient Active Problem List    Diagnosis   • Gastrointestinal hemorrhage [K92.2]   • Acute blood loss anemia [D62]   • Gastrointestinal hemorrhage with melena [K92.1]   • Syncope [R55]   • Anginal equivalent (CMS/HCC) [I20.8]   • Murmur, cardiac [R01.1]   • Hyperkalemia [E87.5]   • Normal body mass index (BMI) [LKU2626]   • Migraine without status migrainosus, not intractable [G43.909]   • Pain in joint of left shoulder [M25.512]   • Cervical radiculopathy [M54.12]   • Insomnia [G47.00]   • S/P gastric bypass [Z98.84]   • History of pulmonary embolism [Z86.711]   • Osteoporosis [M81.0]   • Vitamin B 12 deficiency [E53.8]   • Nerve pain [M79.2]   • Chronic pain syndrome [G89.4]   • Seronegative rheumatoid arthritis (CMS/[facility]) [M06.00]   • Abnormal stress test [R94.39]       Health Maintenance   Topic Date Due   • ANNUAL PHYSICAL  10/02/1964   • ZOSTER VACCINE (1 of 2) 10/01/2021 (Originally 10/2/2011)   • MAMMOGRAM  08/12/2022   • DXA SCAN  09/08/2022   •  AMB Pneumococcal Vaccine 65+ (2 of 2 - PPSV23) 10/02/2026   • COLONOSCOPY  02/17/2029   • TDAP/TD VACCINES (2 - Td) 02/25/2029   • HEPATITIS C SCREENING  Completed   • INFLUENZA VACCINE  Completed   •  AMB Pneumococcal Vaccine 0-64  Aged Out   • PAP SMEAR  Discontinued       Immunization History   Administered Date(s) Administered   • Flu Mist 12/01/2015, 10/07/2016   • Flu Vaccine Quad PF >36MO 10/20/2017   • Flulaval/Fluarix Quad 09/22/2020   • Hepatitis A 02/25/2019, 10/18/2019   • Influenza Split Preservative Free ID 09/27/2019   • Influenza TIV (IM) 10/17/2014   • Influenza, Unspecified 10/03/2014, 10/07/2016   • Pneumococcal Conjugate 13-Valent (PCV13) 11/17/2015   • Pneumococcal  "Polysaccharide (PPSV23) 10/03/2014   • Pneumococcal, Unspecified 10/03/2014   • Td 03/16/2001, 06/03/2005   • Td, Unspecified 06/03/2005   • Tdap 02/25/2019   • flucelvax quad pfs =>4 YRS 02/08/2019       Review of Systems   Constitutional: Negative for fever.   Gastrointestinal: Positive for abdominal pain. Negative for indigestion.   Genitourinary:        One time possible vaginal vs rectal bleeding   Musculoskeletal: Positive for arthralgias.   Psychiatric/Behavioral: Positive for stress.   All other systems reviewed and are negative.      The following portions of the patient's history were reviewed and updated as appropriate: allergies, current medications, past family history, past medical history, past social history, past surgical history and problem list.    Objective   Visit Vitals  /70   Pulse 77   Temp 97.5 °F (36.4 °C) (Temporal)   Resp 18   Ht 160 cm (62.99\")   Wt 57.4 kg (126 lb 8 oz)   SpO2 99%   BMI 22.41 kg/m²        Physical Exam  Vitals signs and nursing note reviewed.   Constitutional:       General: She is not in acute distress.     Appearance: Normal appearance. She is well-developed. She is not ill-appearing, toxic-appearing or diaphoretic.      Interventions: Face mask in place.   HENT:      Head: Normocephalic and atraumatic. Hair is normal.      Right Ear: Hearing, tympanic membrane, ear canal and external ear normal.      Left Ear: Hearing, tympanic membrane, ear canal and external ear normal.   Eyes:      General: Lids are normal. Gaze aligned appropriately. No scleral icterus.        Right eye: No discharge.         Left eye: No discharge.      Extraocular Movements: Extraocular movements intact.      Conjunctiva/sclera: Conjunctivae normal.      Pupils: Pupils are equal, round, and reactive to light.   Neck:      Musculoskeletal: Neck supple.      Thyroid: No thyromegaly.      Trachea: Trachea and phonation normal. No tracheal deviation.   Cardiovascular:      Rate and Rhythm: " Normal rate and regular rhythm.      Heart sounds: Normal heart sounds. No murmur. No friction rub. No gallop.    Pulmonary:      Effort: Pulmonary effort is normal.      Breath sounds: Normal breath sounds.   Chest:      Chest wall: No tenderness.   Abdominal:      General: Bowel sounds are normal. There is no distension.      Palpations: Abdomen is soft. Abdomen is not rigid. There is no mass.      Tenderness: There is no abdominal tenderness. There is no guarding or rebound.   Musculoskeletal:         General: No tenderness or deformity.      Right lower leg: No edema.      Left lower leg: No edema.   Lymphadenopathy:      Head:      Right side of head: No submandibular adenopathy.      Left side of head: No submandibular adenopathy.      Cervical: No cervical adenopathy.   Skin:     General: Skin is warm.      Capillary Refill: Capillary refill takes less than 2 seconds.      Coloration: Skin is not pale.      Findings: No rash.      Nails: There is no clubbing.     Neurological:      General: No focal deficit present.      Mental Status: She is alert and oriented to person, place, and time.      Cranial Nerves: No cranial nerve deficit.      Motor: No tremor, atrophy, abnormal muscle tone or seizure activity.      Gait: Gait normal.   Psychiatric:         Attention and Perception: Attention and perception normal.         Mood and Affect: Mood and affect normal.         Speech: Speech normal.         Behavior: Behavior normal. Behavior is cooperative.         Thought Content: Thought content normal.         Cognition and Memory: Cognition and memory normal.         Judgment: Judgment normal.         Lab Results   Component Value Date    CHLPL 192 04/17/2019    TRIG 88 04/17/2019    HDL 84 04/17/2019    LDL 90 04/17/2019     Lab Results   Component Value Date    TSH 2.220 05/08/2018     Lab Results   Component Value Date    FREET4 0.79 10/27/2017     No results found for: HGBA1C    Assessment     Problem List  Items Addressed This Visit        Cardiovascular and Mediastinum    Migraine without status migrainosus, not intractable    Overview     · Current therapy: Topamax 100 mg bid         Relevant Medications    oxyCODONE-acetaminophen (PERCOCET) 5-325 MG per tablet    topiramate (TOPAMAX) 100 MG tablet    gabapentin (NEURONTIN) 800 MG tablet       Digestive    S/P gastric bypass (Chronic)    Relevant Orders    Vitamin B1, Whole Blood    Vitamin B 12 deficiency    Relevant Medications    Cyanocobalamin 1000 MCG/ML kit    Syringe, Disposable, 1 ML misc       Nervous and Auditory    Nerve pain    Relevant Medications    gabapentin (NEURONTIN) 800 MG tablet    Chronic pain syndrome    Relevant Medications    topiramate (TOPAMAX) 100 MG tablet    gabapentin (NEURONTIN) 800 MG tablet       Musculoskeletal and Integument    Osteoporosis    Overview     · Has had compression fracture  · Past use of Forteo (started approx 11/2018), transitioned to Fosamax after completing.         Current Assessment & Plan     Improved lumbar spine per DEXA but worsened density wrist.  Continue fosamax, no side effects appreciated  Consider repeat DEXA 2 years.         Relevant Medications    alendronate (FOSAMAX) 70 MG tablet    Cholecalciferol (Vitamin D3) 50 MCG (2000 UT) capsule    Other Relevant Orders    Vitamin D 25 Hydroxy    Comprehensive Metabolic Panel      Other Visit Diagnoses     Annual physical exam    -  Primary    Environmental and seasonal allergies        Relevant Medications    fluticasone (FLONASE) 50 MCG/ACT nasal spray    Need for influenza vaccination        Relevant Orders    FluLaval Quad >6 Months ([id number]) (Completed)    Gastroesophageal reflux disease, esophagitis presence not specified        Relevant Medications    sucralfate (Carafate) 1 g tablet    Fatigue, unspecified type        Relevant Orders    Vitamin B1, Whole Blood    Vitamin B12    Folate    Vitamin D 25 Hydroxy    CBC & Differential    Screening,  lipid        Relevant Orders    Lipid Panel        CONTROLLED SUBSTANCE TRACKING 4/2/2018 8/7/2018 12/12/2018   Last [place] 4/2/2018 8/7/2018 12/12/2018   Report Number [id number]   Last UDS 10/20/2017 10/20/2017 -   Last Controlled Substance Agreement 10/20/2017 10/20/2017 -         · Health maintenance information provided with patient plan.   · Counseled on age appropriate health screenings.  · Immunizations for age discussed, encouraged.   · Encourage healthy habits such as exercise, healthy diet.  Patient's Body mass index is 22.41 kg/m². BMI is within normal parameters. No follow-up required..  · DELMER reviewed after visit. She's been getting gabapentin from another provider, most recently filled 9/5. I've asked my CMA to call and cancel the refill that was sent today, and patient must obtain future refills from that provider.     Eugenie Rivera MD

## 2020-09-22 NOTE — ASSESSMENT & PLAN NOTE
Improved lumbar spine per DEXA but worsened density wrist.  Continue fosamax, no side effects appreciated  Consider repeat DEXA 2 years.

## 2020-09-22 NOTE — TELEPHONE ENCOUNTER
Left a detailed message for pt explaining why Dr. Rivera can not prescribe the gabapentin needs to get from other doctor (pain clinic). Spoke with Celina at [facility] and cancelled gabapentin that was sent in by Dr. Rivera today.

## 2020-09-25 LAB
25(OH)D3+25(OH)D2 SERPL-MCNC: 38.4 NG/ML (ref 30–100)
ALBUMIN SERPL-MCNC: 4.7 G/DL (ref 3.5–5.2)
ALBUMIN/GLOB SERPL: 3.1 G/DL
ALP SERPL-CCNC: 126 U/L (ref 39–117)
ALT SERPL-CCNC: 12 U/L (ref 1–33)
AST SERPL-CCNC: 19 U/L (ref 1–32)
BASOPHILS # BLD AUTO: 0.02 10*3/MM3 (ref 0–0.2)
BASOPHILS NFR BLD AUTO: 0.5 % (ref 0–1.5)
BILIRUB SERPL-MCNC: 0.5 MG/DL (ref 0–1.2)
BUN SERPL-MCNC: 10 MG/DL (ref 6–20)
BUN/CREAT SERPL: 14.5 (ref 7–25)
CALCIUM SERPL-MCNC: 9 MG/DL (ref 8.6–10.5)
CHLORIDE SERPL-SCNC: 108 MMOL/L (ref 98–107)
CHOLEST SERPL-MCNC: 170 MG/DL (ref 0–200)
CO2 SERPL-SCNC: 25 MMOL/L (ref 22–29)
CREAT SERPL-MCNC: 0.69 MG/DL (ref 0.57–1)
EOSINOPHIL # BLD AUTO: 0.08 10*3/MM3 (ref 0–0.4)
EOSINOPHIL NFR BLD AUTO: 2 % (ref 0.3–6.2)
ERYTHROCYTE [DISTWIDTH] IN BLOOD BY AUTOMATED COUNT: 14.3 % (ref 12.3–15.4)
FOLATE SERPL-MCNC: 7 NG/ML (ref 4.78–24.2)
GLOBULIN SER CALC-MCNC: 1.5 GM/DL
GLUCOSE SERPL-MCNC: 76 MG/DL (ref 65–99)
HCT VFR BLD AUTO: 36.5 % (ref 34–46.6)
HDLC SERPL-MCNC: 74 MG/DL (ref 40–60)
HGB BLD-MCNC: 12.3 G/DL (ref 12–15.9)
IMM GRANULOCYTES # BLD AUTO: 0.01 10*3/MM3 (ref 0–0.05)
IMM GRANULOCYTES NFR BLD AUTO: 0.2 % (ref 0–0.5)
LDLC SERPL CALC-MCNC: 82 MG/DL (ref 0–100)
LYMPHOCYTES # BLD AUTO: 1.7 10*3/MM3 (ref 0.7–3.1)
LYMPHOCYTES NFR BLD AUTO: 41.6 % (ref 19.6–45.3)
MCH RBC QN AUTO: 28.7 PG (ref 26.6–33)
MCHC RBC AUTO-ENTMCNC: 33.7 G/DL (ref 31.5–35.7)
MCV RBC AUTO: 85.1 FL (ref 79–97)
MONOCYTES # BLD AUTO: 0.35 10*3/MM3 (ref 0.1–0.9)
MONOCYTES NFR BLD AUTO: 8.6 % (ref 5–12)
NEUTROPHILS # BLD AUTO: 1.93 10*3/MM3 (ref 1.7–7)
NEUTROPHILS NFR BLD AUTO: 47.1 % (ref 42.7–76)
NRBC BLD AUTO-RTO: 0 /100 WBC (ref 0–0.2)
PLATELET # BLD AUTO: 212 10*3/MM3 (ref 140–450)
POTASSIUM SERPL-SCNC: 3.6 MMOL/L (ref 3.5–5.2)
PROT SERPL-MCNC: 6.2 G/DL (ref 6–8.5)
RBC # BLD AUTO: 4.29 10*6/MM3 (ref 3.77–5.28)
SODIUM SERPL-SCNC: 145 MMOL/L (ref 136–145)
TRIGL SERPL-MCNC: 71 MG/DL (ref 0–150)
VIT B1 BLD-SCNC: 114.4 NMOL/L (ref 66.5–200)
VIT B12 SERPL-MCNC: >2000 PG/ML (ref 211–946)
VLDLC SERPL CALC-MCNC: 14.2 MG/DL
WBC # BLD AUTO: 4.09 10*3/MM3 (ref 3.4–10.8)

## 2020-09-28 ENCOUNTER — OFFICE VISIT (OUTPATIENT)
Dept: INTERNAL MEDICINE | Facility: CLINIC | Age: 60
End: 2020-09-28

## 2020-09-28 VITALS
TEMPERATURE: 97.3 F | DIASTOLIC BLOOD PRESSURE: 80 MMHG | WEIGHT: 121.38 LBS | OXYGEN SATURATION: 99 % | SYSTOLIC BLOOD PRESSURE: 135 MMHG | HEART RATE: 67 BPM | HEIGHT: 63 IN | BODY MASS INDEX: 21.51 KG/M2

## 2020-09-28 DIAGNOSIS — M79.2 NERVE PAIN: ICD-10-CM

## 2020-09-28 DIAGNOSIS — K21.9 GASTROESOPHAGEAL REFLUX DISEASE, ESOPHAGITIS PRESENCE NOT SPECIFIED: ICD-10-CM

## 2020-09-28 DIAGNOSIS — J30.89 ENVIRONMENTAL AND SEASONAL ALLERGIES: ICD-10-CM

## 2020-09-28 DIAGNOSIS — F22 PARANOIA (HCC): Primary | ICD-10-CM

## 2020-09-28 DIAGNOSIS — G43.909 MIGRAINE WITHOUT STATUS MIGRAINOSUS, NOT INTRACTABLE, UNSPECIFIED MIGRAINE TYPE: ICD-10-CM

## 2020-09-28 DIAGNOSIS — G89.4 CHRONIC PAIN SYNDROME: ICD-10-CM

## 2020-09-28 DIAGNOSIS — Z02.83 ENCOUNTER FOR DRUG SCREENING: ICD-10-CM

## 2020-09-28 PROCEDURE — 99214 OFFICE O/P EST MOD 30 MIN: CPT | Performed by: FAMILY MEDICINE

## 2020-09-28 RX ORDER — FLUTICASONE PROPIONATE 50 MCG
2 SPRAY, SUSPENSION (ML) NASAL DAILY
Qty: 3 BOTTLE | Refills: 3 | Status: SHIPPED | OUTPATIENT
Start: 2020-09-28 | End: 2020-10-01 | Stop reason: SDUPTHER

## 2020-09-28 RX ORDER — SUCRALFATE 1 G/1
1 TABLET ORAL 4 TIMES DAILY
Qty: 120 TABLET | Refills: 2 | Status: SHIPPED | OUTPATIENT
Start: 2020-09-28 | End: 2020-09-30 | Stop reason: SDUPTHER

## 2020-09-28 RX ORDER — TOPIRAMATE 100 MG/1
100 TABLET, FILM COATED ORAL 2 TIMES DAILY
Qty: 180 TABLET | Refills: 3 | Status: SHIPPED | OUTPATIENT
Start: 2020-09-28 | End: 2021-01-05 | Stop reason: SDUPTHER

## 2020-09-28 NOTE — TELEPHONE ENCOUNTER
Caller: Felty, Connie May    Relationship: Self    Best call back number: [phone]    Medication needed:   Requested Prescriptions     Pending Prescriptions Disp Refills   • sucralfate (Carafate) 1 g tablet 120 tablet 2     Sig: Take 1 tablet by mouth 4 (Four) Times a Day.   • fluticasone (FLONASE) 50 MCG/ACT nasal spray 3 bottle 3     Si sprays into the nostril(s) as directed by provider Daily.   • topiramate (TOPAMAX) 100 MG tablet 180 tablet 3     Sig: Take 1 tablet by mouth 2 (Two) Times a Day.       What details did the patient provide when requesting the medication: PATIENT REQUESTING MEDICATIONS TO BE SENT TOGETHER.     Does the patient have less than a 3 day supply:  [] Yes  [x] No    What is the patient's preferred pharmacy: [address] [phone] [facility] [phone]
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
## 2020-09-29 ENCOUNTER — PATIENT OUTREACH (OUTPATIENT)
Dept: CASE MANAGEMENT | Facility: OTHER | Age: 60
End: 2020-09-29

## 2020-09-29 NOTE — OUTREACH NOTE
"Patient Outreach Note    SW reached to pt to follow up. SW asked pt how she was feeling today. Pt stated, \"I feel fine thank you for asking\". SW asked pt if she needed any resources at this time and pt stated, \"No I think I am ok\". SW asked pt the following questions:    Housing- Pt stated I do have housing and I live alone    Income- Pt stated, I do have income but I am not working at this time.    Transportation- Pt stated , \"yes I have transportation\"    Food- Pt stated, Yes I am ok with food right now    Mental Health- SW asked if pt was experiencing any anxiety or depression and pt stated , \"No I am ok\".  SW asked if pt was seeing a counselor or psychiatrist and pt stated, \"No I don't because I feel fine\".    SW will route this call to the provider and will also follow up with pt in a few days.      NAOMIE Guevara  Ambulatory     9/29/2020, 10:31 EDT      "

## 2020-09-30 DIAGNOSIS — K21.9 GASTROESOPHAGEAL REFLUX DISEASE, ESOPHAGITIS PRESENCE NOT SPECIFIED: ICD-10-CM

## 2020-09-30 RX ORDER — SUCRALFATE 1 G/1
1 TABLET ORAL 4 TIMES DAILY
Qty: 120 TABLET | Refills: 2 | Status: SHIPPED | OUTPATIENT
Start: 2020-09-30 | End: 2020-10-01 | Stop reason: SDUPTHER

## 2020-09-30 NOTE — TELEPHONE ENCOUNTER
Caller: [id number] - [place] - [address] AT [address] [address] [address] [phone]  - [phone]     Relationship: Pharmacy    Best call back number: [phone]    Medication needed:   Requested Prescriptions     Pending Prescriptions Disp Refills   • sucralfate (Carafate) 1 g tablet 120 tablet 2     Sig: Take 1 tablet by mouth 4 (Four) Times a Day.       When do you need the refill by: TODAY     What details did the patient provide when requesting the medication: PATIENT IS OUT OF MEDICATION FOR A FEW DAYS   Does the patient have less than a 3 day supply:  [x] Yes  [] No    What is the patient's preferred pharmacy:  [id number] - [place] - [address] AT [address] [address] [address] - [phone]

## 2020-10-01 ENCOUNTER — TELEPHONE (OUTPATIENT)
Dept: INTERNAL MEDICINE | Facility: CLINIC | Age: 60
End: 2020-10-01

## 2020-10-01 DIAGNOSIS — J30.89 ENVIRONMENTAL AND SEASONAL ALLERGIES: ICD-10-CM

## 2020-10-01 DIAGNOSIS — K21.9 GASTROESOPHAGEAL REFLUX DISEASE: ICD-10-CM

## 2020-10-01 RX ORDER — SUCRALFATE 1 G/1
1 TABLET ORAL 4 TIMES DAILY
Qty: 120 TABLET | Refills: 2 | Status: SHIPPED | OUTPATIENT
Start: 2020-10-01 | End: 2021-03-23 | Stop reason: SDUPTHER

## 2020-10-01 RX ORDER — FLUTICASONE PROPIONATE 50 MCG
2 SPRAY, SUSPENSION (ML) NASAL DAILY
Qty: 3 BOTTLE | Refills: 3 | Status: SHIPPED | OUTPATIENT
Start: 2020-10-01 | End: 2021-06-02

## 2020-10-01 NOTE — TELEPHONE ENCOUNTER
There are two telephone encounters for yesterday. One was for Carafate (sucralafate) refill, which was sent. Next encounter says changing pharmacy but doesn't note any refill requests and was after the Carafate was sent.     I resent Carafate to the new pharmacy per yesterday's phone message. I also refilled the nasal spray and [facility] to [facility].    Patient was using a mail order pharmacy pill pack so I'm not sure why she's changing.    In a previous note gabapentin is discussed, I had called in to mail order but then per DELMER review she wasn't due and another provider was filling, so our refill was cancelled on it. That's not one I can refill.    There's another phone encounter for today from [facility] from the mail order pharmacy, not sure what that's about.     I'm worried about her based on her complaints, behavior is different than previous visits. Social work has contacted her but she did not need anything she said from .     Please clarify what she's needing, patient, and if she's agreeable I'd like to have her set up with peter in our office to see if she can help in any way.

## 2020-10-01 NOTE — TELEPHONE ENCOUNTER
Elsa from [facility] is calling to clarify if she is to be getting generic Sucralafate.  She can be reached at [phone]

## 2020-10-01 NOTE — TELEPHONE ENCOUNTER
"PATIENT STATES THAT THE PHARMACY IS NOT GIVING HER THE MEDICATION THAT WAS SUPPOSED TO BE CALLED IN AND IS GIVING HER \"DRUGS\". SHE STATES THAT THEY GAVE HER THE \"GENERIC\" AND THAT IT IS NOT THE GENERIC. NEVER GOT THE NASAL SPRAY EITHER.   "

## 2020-10-02 NOTE — TELEPHONE ENCOUNTER
Spoke with Felicita at [facility] pharmacy and she states the patient refused the generic, she only wanted the brand name carafate. Advised brand name is ok to dispense.

## 2020-10-05 LAB
AMMONIA PLAS-MCNC: 38 UG/DL (ref 34–178)
AMPHETAMINES SPEC-MCNC: NEGATIVE NG/ML
BARBITURATES SERPLBLD QL: NEGATIVE UG/ML
BENZODIAZ BLD QL: NEGATIVE NG/ML
BUPRENORPHINE BLD QL SCN: NEGATIVE NG/ML
CANNABINOIDS BLD QL SCN: NEGATIVE NG/ML
CARISOPRODOL+MEPROB BLD QL SCN: NEGATIVE UG/ML
COCAINE+BZE SERPLBLD QL SCN: NEGATIVE NG/ML
FENTANYL BLD QL SCN: NEGATIVE NG/ML
GABAPENTIN SERPLBLD QL SCN: ABNORMAL UG/ML
GABAPENTIN SERPLBLD QL SCN: POSITIVE
GABAPENTIN SERPLBLD-MCNC: 7 UG/ML
MEPERIDINE SERPLBLD QL SCN: NEGATIVE NG/ML
METHADONE BLD QL SCN: NEGATIVE NG/ML
OPIATES BLD QL SCN: NEGATIVE NG/ML
OXYCODONE SERPLBLD CFM-MCNC: 6.1 NG/ML
OXYCODONE+OXYMORPHONE SERPLBLD CFM-IMP: POSITIVE
OXYCODONE+OXYMORPHONE SERPLBLD QL SCN: ABNORMAL NG/ML
OXYMORPHONE SERPLBLD CFM-MCNC: NEGATIVE NG/ML
PCP BLD QL SCN: NEGATIVE NG/ML
PROPOXYPH BLD QL SCN: NEGATIVE NG/ML
TRAMADOL BLD QL SCN: NEGATIVE NG/ML

## 2020-10-22 DIAGNOSIS — M81.8 OTHER OSTEOPOROSIS WITHOUT CURRENT PATHOLOGICAL FRACTURE: ICD-10-CM

## 2020-10-22 DIAGNOSIS — G43.909 MIGRAINE WITHOUT STATUS MIGRAINOSUS, NOT INTRACTABLE, UNSPECIFIED MIGRAINE TYPE: ICD-10-CM

## 2020-10-22 DIAGNOSIS — M79.2 NERVE PAIN: ICD-10-CM

## 2020-10-22 DIAGNOSIS — G89.4 CHRONIC PAIN SYNDROME: ICD-10-CM

## 2020-10-22 RX ORDER — ACETAMINOPHEN 160 MG
2000 TABLET,DISINTEGRATING ORAL DAILY
Qty: 90 CAPSULE | Refills: 3 | OUTPATIENT
Start: 2020-10-22

## 2020-10-22 RX ORDER — GABAPENTIN 800 MG/1
800 TABLET ORAL 4 TIMES DAILY
Qty: 360 TABLET | Refills: 1 | OUTPATIENT
Start: 2020-10-22

## 2020-10-22 RX ORDER — TOPIRAMATE 100 MG/1
100 TABLET, FILM COATED ORAL 2 TIMES DAILY
Qty: 180 TABLET | Refills: 3 | OUTPATIENT
Start: 2020-10-22

## 2020-10-22 NOTE — TELEPHONE ENCOUNTER
Last office visit  09/28/2020    Controlled substance agreement on file  Last DELMER 09/19/2020  Last UDS 06/26/2020

## 2020-10-22 NOTE — TELEPHONE ENCOUNTER
Look back at phone encounters from earlier this month.    She called earlier this month (approx 3 weeks ago) and asked that all meds be sent to a different pharmacy and I did that. Should not need refills on these except for gabapentin. I had refilled but then saw DELMER that she wasn't due and that a different provider had sent it in just a week or two before. That refill was cancelled. Need clarification from patient on who she's seeing, as I said previously I would not fill if she was getting from the other provider.    No

## 2020-10-22 NOTE — TELEPHONE ENCOUNTER
Caller: Felty, Connie May    Relationship: Self    Best call back number: [phone]     Medication needed:   Requested Prescriptions     Pending Prescriptions Disp Refills   • gabapentin (NEURONTIN) 800 MG tablet 360 tablet 1     Sig: Take 1 tablet by mouth 4 (Four) Times a Day. Has been getting refilled from another provider. 09/22/20   • topiramate (TOPAMAX) 100 MG tablet 180 tablet 3     Sig: Take 1 tablet by mouth 2 (Two) Times a Day.   • Cholecalciferol (Vitamin D3) 50 MCG (2000 UT) capsule 90 capsule 3     Sig: Take 1 capsule by mouth Daily.       When do you need the refill by: 10/22/20    What details did the patient provide when requesting the medication: PATIENT STATES HER MEDICATION IS LOCKED IN HER SAFE AND SHE CANNOT GET INTO IT. SHE STATES SHE NO LONGER HAS THE BACKUP KEY. PATIENT GOT OFF THE CALL BEFORE ABLE TO VERIFY PHARMACY. PLEASE CONTACT.     Does the patient have less than a 3 day supply:  [x] Yes  [] No    What is the patient's preferred pharmacy:  [address] [phone] [facility] [phone]

## 2020-10-26 NOTE — TELEPHONE ENCOUNTER
Attempted to contact patient; left detailed message per release asking patient to contact office to discuss medication

## 2020-10-30 NOTE — TELEPHONE ENCOUNTER
Patient states that she was out of cell service yesterday and that's why she wasn't able to call back right away.  She also states that she received her medication out of the safe (?) so she is good on refills until next month.  Thank you.

## 2020-11-04 DIAGNOSIS — M79.2 NERVE PAIN: ICD-10-CM

## 2020-11-04 DIAGNOSIS — G43.909 MIGRAINE WITHOUT STATUS MIGRAINOSUS, NOT INTRACTABLE, UNSPECIFIED MIGRAINE TYPE: ICD-10-CM

## 2020-11-04 DIAGNOSIS — G89.4 CHRONIC PAIN SYNDROME: ICD-10-CM

## 2020-11-04 RX ORDER — TOPIRAMATE 100 MG/1
100 TABLET, FILM COATED ORAL 2 TIMES DAILY
Qty: 180 TABLET | Refills: 3 | OUTPATIENT
Start: 2020-11-04

## 2020-11-04 RX ORDER — GABAPENTIN 800 MG/1
800 TABLET ORAL 4 TIMES DAILY
Qty: 360 TABLET | Refills: 1 | OUTPATIENT
Start: 2020-11-04

## 2020-11-04 NOTE — TELEPHONE ENCOUNTER
Caller: FELTY THOMAS    Relationship: Emergency Contact    Best call back number: [phone]  Medication needed:   Requested Prescriptions     Pending Prescriptions Disp Refills   • gabapentin (NEURONTIN) 800 MG tablet 360 tablet 1     Sig: Take 1 tablet by mouth 4 (Four) Times a Day. Has been getting refilled from another provider. 09/22/20   • topiramate (TOPAMAX) 100 MG tablet 180 tablet 3     Sig: Take 1 tablet by mouth 2 (Two) Times a Day.       When do you need the refill by: ASAP    What details did the patient provide when requesting the medication:     Does the patient have less than a 3 day supply:  [x] Yes  [] No    What is the patient's preferred pharmacy:    [address] [place]  [address] [address] - [phone]  - [phone] FX

## 2020-11-04 NOTE — TELEPHONE ENCOUNTER
Left message for pt's son that Dr. Rivera does not write the pt's gabapentin any longer, she gets that from someone else. The topamax was filled as a 90 day supply on 9/28/2020 at [facility]. If that rx needs to be changed to [facility], they will need to call and have the pharmacy transfer the rx. Advised to call me back if he has any further questions or concerns.

## 2020-11-05 ENCOUNTER — TELEPHONE (OUTPATIENT)
Dept: INTERNAL MEDICINE | Facility: CLINIC | Age: 60
End: 2020-11-05

## 2020-11-05 NOTE — TELEPHONE ENCOUNTER
PATIENT CALLED BACK STATED THAT SHE WAS DISMISSED FROM THE PAIN CLINIC BECAUSE SOME HOW HER MEDICINE GOT CHANGED FROM 800 MG  MG EVEN THOU IT WAS LOCKED IN SAFE PLEASE ADVISE WANTS A CALL BACK THANKS

## 2020-11-05 NOTE — TELEPHONE ENCOUNTER
Based on the last visits, phone messages I'm not comfortable with prescribing them. If she wishes to have a new pain management referral placed I can do that.

## 2020-11-06 ENCOUNTER — TELEPHONE (OUTPATIENT)
Dept: PEDIATRICS | Facility: OTHER | Age: 60
End: 2020-11-06

## 2020-11-06 NOTE — TELEPHONE ENCOUNTER
Left detailed message for the patient that Dr. Rivera is not comfortable prescribing these medications for her. If she would like a new ref to pain management let me know and that can be placed.

## 2020-11-13 ENCOUNTER — TELEPHONE (OUTPATIENT)
Dept: INTERNAL MEDICINE | Facility: CLINIC | Age: 60
End: 2020-11-13

## 2020-11-13 NOTE — TELEPHONE ENCOUNTER
Debra called and said she had another bug in her that came out with a spot beside it. Was very incoherent on the phone and unable to relay what she needed. First she called about the bugs, then said she needed her lab results, then she said she was calling because she thought that Dr. Rivera called the  on her about taking her to a facility. Then she realized that  did not know where she was staying and it could not have been Dr. Rivera. She declined the facility and said she's walking 15 miles back to the place she is staying. Once she explained this, she repeated that she would like her lab results. We ended up being disconnected with the patient.
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
## 2020-11-16 ENCOUNTER — PATIENT OUTREACH (OUTPATIENT)
Dept: CASE MANAGEMENT | Facility: OTHER | Age: 60
End: 2020-11-16

## 2020-11-16 ENCOUNTER — EPISODE CHANGES (OUTPATIENT)
Dept: CASE MANAGEMENT | Facility: OTHER | Age: 60
End: 2020-11-16

## 2020-11-16 NOTE — PROGRESS NOTES
Just as son said, she doesn't feel she has a problem, so we're at a loss on what to do here as well.    Renate, my MA, worked on this patient throughout the day Friday, reaching out to law enforcement even to have a welfare check. She was told that she'd be taken in for 72 hour hold if she didn't seem right, but it sounds as though this probably didn't happen.    Thank you for trying. I worry she's a danger to herself and possibly others (if she's confused and thinks somebody is going to hurt her--she has said at last visit she thinks she's being poisoned).

## 2020-11-16 NOTE — OUTREACH NOTE
"Patient Outreach Note    SW attempted to reach out to the pt. No answer. LVM    SW reached out to pt's son, Petey. Petey stated that he spoke with his mom on Friday and she was on the side of the road in [place]\". Petey stated, \"I cannot believe that she has gone back to [place] because there are so much drugs there and that is the last place she needs to be\".  SW asked Petey how pt was acting on Friday when he spoke with her. Petey stated, \" She was acting crazy and saying things like I just saw a deer on the side of the road but it is actually the spirit of my friend Bertram's body\". Petey stated, \" I don't know how to help her when she is like this, because she does not think she has a problem and therefore does not want to take meds to help her\". NUSRAT asked Petey if there was any resources that he or she needed at this time. Petey stated, \" No, and I have honestly been trying to stay out of it as much as possible because I have so much more on my plate with my sister and mom is the last thing that I am worried about\". Petey stated, \" But if something changes or if I have questions about her then I will call you back\". NUSRAT told Petey that yes he is welcome to call if he has any further questions or concerns.    NAOMIE Guevara  Ambulatory     11/16/2020, 12:51 EST      "

## 2021-01-05 DIAGNOSIS — G43.909 MIGRAINE WITHOUT STATUS MIGRAINOSUS, NOT INTRACTABLE, UNSPECIFIED MIGRAINE TYPE: ICD-10-CM

## 2021-01-05 DIAGNOSIS — M79.2 NERVE PAIN: ICD-10-CM

## 2021-01-05 DIAGNOSIS — M81.8 OTHER OSTEOPOROSIS WITHOUT CURRENT PATHOLOGICAL FRACTURE: ICD-10-CM

## 2021-01-05 DIAGNOSIS — E53.8 VITAMIN B 12 DEFICIENCY: ICD-10-CM

## 2021-01-05 DIAGNOSIS — G89.4 CHRONIC PAIN SYNDROME: ICD-10-CM

## 2021-01-05 RX ORDER — ACETAMINOPHEN 160 MG
2000 TABLET,DISINTEGRATING ORAL DAILY
Qty: 90 CAPSULE | Refills: 3 | Status: CANCELLED | OUTPATIENT
Start: 2021-01-05

## 2021-01-05 RX ORDER — RISPERIDONE 1 MG/1
TABLET ORAL
COMMUNITY
Start: 2021-01-04 | End: 2021-01-11 | Stop reason: SDDI

## 2021-01-05 NOTE — TELEPHONE ENCOUNTER
Caller: Felty, Connie May    Relationship: Self    Best call back number: [phone]    Medication needed:   Requested Prescriptions     Pending Prescriptions Disp Refills   • topiramate (TOPAMAX) 100 MG tablet 180 tablet 3     Sig: Take 1 tablet by mouth 2 (Two) Times a Day.   • gabapentin (NEURONTIN) 800 MG tablet 360 tablet 1     Sig: Take 1 tablet by mouth 4 (Four) Times a Day. Has been getting refilled from another provider. 09/22/20   • Cholecalciferol (Vitamin D3) 50 MCG (2000 UT) capsule 90 capsule 3     Sig: Take 1 capsule by mouth Daily.       When do you need the refill by: ASAP    What details did the patient provide when requesting the medication: PATIENT STATES THAT SHE NEEDS THESE MEDICATIONS REFILLED.    Does the patient have less than a 3 day supply:  [x] Yes  [] No    What is the patient's preferred pharmacy: [id number] - [facility] [place] - [address] AT [facility] [address] - [phone]  - [phone] FX

## 2021-01-05 NOTE — TELEPHONE ENCOUNTER
Spoke with pt and she states she was released home yesterday and needs refills of the Topamax, B12, and Gabapentin. She would like these sent to the [facility] in [place], [place] because that is where she is staying now. Advised Dr. Rivera does not prescribe her gabapentin, the pain clinic does so she needs to call them for the refill. Pt states she was released from the Pain clinic care back in October. Advised we can place another referral to a different Pain management office. She is agreeable to that but would like a refill of gabapentin until that goes through. She also states she was given a medicine she was told she has to take, but it makes her shaky so she does not want to continue it. Medication (Found in med reconciliation) is Risperdal. She wants to take the Topamax and not the Risperdal because that's how Dr. Rivera has always treated her. Advised those are 2 different meds and treat 2 different conditions. Pt states she is not going to take it anymore. Tried to explain some meds can make you feel shaky in the beginning but urged her to continue and see if the shakes get better. Pt refuses. Advised I will send this message to Dr. Rivera.    Pt was more lucid on the phone today than our last conversation. Conversation was straight forward and did not darrel off on tangents.

## 2021-01-07 RX ORDER — TOPIRAMATE 100 MG/1
100 TABLET, FILM COATED ORAL 2 TIMES DAILY
Qty: 180 TABLET | Refills: 0 | Status: SHIPPED | OUTPATIENT
Start: 2021-01-07 | End: 2021-06-02

## 2021-01-07 RX ORDER — GABAPENTIN 800 MG/1
800 TABLET ORAL 4 TIMES DAILY
Qty: 360 TABLET | Refills: 1 | OUTPATIENT
Start: 2021-01-07

## 2021-01-11 ENCOUNTER — OFFICE VISIT (OUTPATIENT)
Dept: INTERNAL MEDICINE | Facility: CLINIC | Age: 61
End: 2021-01-11

## 2021-01-11 VITALS
RESPIRATION RATE: 18 BRPM | OXYGEN SATURATION: 100 % | SYSTOLIC BLOOD PRESSURE: 91 MMHG | BODY MASS INDEX: 21.35 KG/M2 | TEMPERATURE: 97 F | DIASTOLIC BLOOD PRESSURE: 34 MMHG | HEART RATE: 82 BPM | HEIGHT: 63 IN | WEIGHT: 120.5 LBS

## 2021-01-11 DIAGNOSIS — F22 PARANOIA (HCC): Primary | ICD-10-CM

## 2021-01-11 DIAGNOSIS — M54.12 CERVICAL RADICULOPATHY: ICD-10-CM

## 2021-01-11 DIAGNOSIS — M79.2 NERVE PAIN: ICD-10-CM

## 2021-01-11 DIAGNOSIS — G89.4 CHRONIC PAIN SYNDROME: ICD-10-CM

## 2021-01-11 DIAGNOSIS — F29 PSYCHOSIS, UNSPECIFIED PSYCHOSIS TYPE (HCC): ICD-10-CM

## 2021-01-11 PROBLEM — D62 ACUTE BLOOD LOSS ANEMIA: Status: RESOLVED | Noted: 2019-02-16 | Resolved: 2021-01-11

## 2021-01-11 PROCEDURE — 99417 PROLNG OP E/M EACH 15 MIN: CPT | Performed by: FAMILY MEDICINE

## 2021-01-11 PROCEDURE — 99215 OFFICE O/P EST HI 40 MIN: CPT | Performed by: FAMILY MEDICINE

## 2021-01-11 RX ORDER — GABAPENTIN 800 MG/1
800 TABLET ORAL 4 TIMES DAILY
Qty: 120 TABLET | Refills: 1 | Status: SHIPPED | OUTPATIENT
Start: 2021-01-11 | End: 2021-02-18 | Stop reason: SDUPTHER
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
## 2021-02-08 ENCOUNTER — TELEPHONE (OUTPATIENT)
Dept: INTERNAL MEDICINE | Facility: CLINIC | Age: 61
End: 2021-02-08

## 2021-02-08 DIAGNOSIS — M54.12 CERVICAL RADICULOPATHY: ICD-10-CM

## 2021-02-08 DIAGNOSIS — M79.2 NERVE PAIN: Primary | ICD-10-CM

## 2021-02-08 DIAGNOSIS — G89.4 CHRONIC PAIN SYNDROME: ICD-10-CM

## 2021-02-08 NOTE — TELEPHONE ENCOUNTER
I sent the referral to [facility] in [place]. They will contact her to make an appt.

## 2021-02-08 NOTE — TELEPHONE ENCOUNTER
Caller: Felty, Connie May    Relationship to patient: Self    Best call back number: [phone]     Chief complaint: LEFT HI PAIN    Type of visit: PAIN CLINIC REFERRAL FOLLOW UP    Requested date: ASAP    If rescheduling, when is the original appointment: NA    Additional notes: STILL LOOKING FOR PAIN CLINIC IN [place] OR [facility] SINCE [facility] WASN'T AVAILABLE

## 2021-02-18 DIAGNOSIS — M54.12 CERVICAL RADICULOPATHY: ICD-10-CM

## 2021-02-18 DIAGNOSIS — M79.2 NERVE PAIN: ICD-10-CM

## 2021-02-18 DIAGNOSIS — G89.4 CHRONIC PAIN SYNDROME: ICD-10-CM

## 2021-02-18 RX ORDER — GABAPENTIN 800 MG/1
800 TABLET ORAL 4 TIMES DAILY
Qty: 60 TABLET | Refills: 0 | Status: SHIPPED | OUTPATIENT
Start: 2021-02-18 | End: 2021-06-02
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
## 2021-03-22 ENCOUNTER — TELEPHONE (OUTPATIENT)
Dept: INTERNAL MEDICINE | Facility: CLINIC | Age: 61
End: 2021-03-22

## 2021-03-22 ENCOUNTER — OFFICE VISIT (OUTPATIENT)
Dept: INTERNAL MEDICINE | Facility: CLINIC | Age: 61
End: 2021-03-22

## 2021-03-22 VITALS
DIASTOLIC BLOOD PRESSURE: 70 MMHG | TEMPERATURE: 97.1 F | HEIGHT: 63 IN | SYSTOLIC BLOOD PRESSURE: 104 MMHG | BODY MASS INDEX: 22.32 KG/M2 | OXYGEN SATURATION: 98 % | HEART RATE: 69 BPM | WEIGHT: 126 LBS | RESPIRATION RATE: 14 BRPM

## 2021-03-22 DIAGNOSIS — E78.2 MIXED HYPERLIPIDEMIA: Primary | ICD-10-CM

## 2021-03-22 DIAGNOSIS — M79.2 NEURALGIA: ICD-10-CM

## 2021-03-22 DIAGNOSIS — R21 RASH AND NONSPECIFIC SKIN ERUPTION: ICD-10-CM

## 2021-03-22 LAB
ALBUMIN SERPL-MCNC: 4.6 G/DL (ref 3.5–5.2)
ALBUMIN/GLOB SERPL: 2.1 G/DL
ALP SERPL-CCNC: 115 U/L (ref 39–117)
ALT SERPL-CCNC: 25 U/L (ref 1–33)
AST SERPL-CCNC: 28 U/L (ref 1–32)
BASOPHILS # BLD AUTO: 0.03 10*3/MM3 (ref 0–0.2)
BASOPHILS NFR BLD AUTO: 0.7 % (ref 0–1.5)
BILIRUB SERPL-MCNC: 0.5 MG/DL (ref 0–1.2)
BUN SERPL-MCNC: 11 MG/DL (ref 6–20)
BUN/CREAT SERPL: 17.2 (ref 7–25)
CALCIUM SERPL-MCNC: 9.2 MG/DL (ref 8.6–10.5)
CHLORIDE SERPL-SCNC: 107 MMOL/L (ref 98–107)
CHOLEST SERPL-MCNC: 204 MG/DL (ref 0–200)
CO2 SERPL-SCNC: 24.1 MMOL/L (ref 22–29)
CREAT SERPL-MCNC: 0.64 MG/DL (ref 0.57–1)
EOSINOPHIL # BLD AUTO: 0.16 10*3/MM3 (ref 0–0.4)
EOSINOPHIL NFR BLD AUTO: 3.8 % (ref 0.3–6.2)
ERYTHROCYTE [DISTWIDTH] IN BLOOD BY AUTOMATED COUNT: 17 % (ref 12.3–15.4)
GLOBULIN SER CALC-MCNC: 2.2 GM/DL
GLUCOSE SERPL-MCNC: 84 MG/DL (ref 65–99)
HCT VFR BLD AUTO: 39.4 % (ref 34–46.6)
HDLC SERPL-MCNC: 75 MG/DL (ref 40–60)
HGB BLD-MCNC: 12.5 G/DL (ref 12–15.9)
IMM GRANULOCYTES # BLD AUTO: 0.01 10*3/MM3 (ref 0–0.05)
IMM GRANULOCYTES NFR BLD AUTO: 0.2 % (ref 0–0.5)
LDLC SERPL CALC-MCNC: 110 MG/DL (ref 0–100)
LYMPHOCYTES # BLD AUTO: 1.92 10*3/MM3 (ref 0.7–3.1)
LYMPHOCYTES NFR BLD AUTO: 45.4 % (ref 19.6–45.3)
MCH RBC QN AUTO: 25.5 PG (ref 26.6–33)
MCHC RBC AUTO-ENTMCNC: 31.7 G/DL (ref 31.5–35.7)
MCV RBC AUTO: 80.2 FL (ref 79–97)
MONOCYTES # BLD AUTO: 0.35 10*3/MM3 (ref 0.1–0.9)
MONOCYTES NFR BLD AUTO: 8.3 % (ref 5–12)
NEUTROPHILS # BLD AUTO: 1.76 10*3/MM3 (ref 1.7–7)
NEUTROPHILS NFR BLD AUTO: 41.6 % (ref 42.7–76)
NRBC BLD AUTO-RTO: 0 /100 WBC (ref 0–0.2)
PLATELET # BLD AUTO: 276 10*3/MM3 (ref 140–450)
POTASSIUM SERPL-SCNC: 3.8 MMOL/L (ref 3.5–5.2)
PROT SERPL-MCNC: 6.8 G/DL (ref 6–8.5)
RBC # BLD AUTO: 4.91 10*6/MM3 (ref 3.77–5.28)
SODIUM SERPL-SCNC: 143 MMOL/L (ref 136–145)
TRIGL SERPL-MCNC: 106 MG/DL (ref 0–150)
VLDLC SERPL CALC-MCNC: 19 MG/DL (ref 5–40)
WBC # BLD AUTO: 4.23 10*3/MM3 (ref 3.4–10.8)

## 2021-03-22 PROCEDURE — 99213 OFFICE O/P EST LOW 20 MIN: CPT | Performed by: INTERNAL MEDICINE

## 2021-03-22 RX ORDER — CLOTRIMAZOLE AND BETAMETHASONE DIPROPIONATE 10; .64 MG/G; MG/G
CREAM TOPICAL 2 TIMES DAILY
Qty: 15 G | Refills: 2 | Status: SHIPPED | OUTPATIENT
Start: 2021-03-22 | End: 2021-06-02

## 2021-03-22 NOTE — PATIENT INSTRUCTIONS
MyPlate from USDA    MyPlate is an outline of a general healthy diet based on the 2010 Dietary Guidelines for Americans, from the U.S. Department of Agriculture (USDA). It sets guidelines for how much food you should eat from each food group based on your age, sex, and level of physical activity.  What are tips for following MyPlate?  To follow MyPlate recommendations:  · Eat a wide variety of fruits and vegetables, grains, and protein foods.  · Serve smaller portions and eat less food throughout the day.  · Limit portion sizes to avoid overeating.  · Enjoy your food.  · Get at least 150 minutes of exercise every week. This is about 30 minutes each day, 5 or more days per week.  It can be difficult to have every meal look like MyPlate. Think about MyPlate as eating guidelines for an entire day, rather than each individual meal.  Fruits and vegetables  · Make half of your plate fruits and vegetables.  · Eat many different colors of fruits and vegetables each day.  · For a 2,000 calorie daily food plan, eat:  ? 2½ cups of vegetables every day.  ? 2 cups of fruit every day.  · 1 cup is equal to:  ? 1 cup raw or cooked vegetables.  ? 1 cup raw fruit.  ? 1 medium-sized orange, apple, or banana.  ? 1 cup 100% fruit or vegetable juice.  ? 2 cups raw leafy greens, such as lettuce, spinach, or kale.  ? ½ cup dried fruit.  Grains  · One fourth of your plate should be grains.  · Make at least half of the grains you eat each day whole grains.  · For a 2,000 calorie daily food plan, eat 6 oz of grains every day.  · 1 oz is equal to:  ? 1 slice bread.  ? 1 cup cereal.  ? ½ cup cooked rice, cereal, or pasta.  Protein  · One fourth of your plate should be protein.  · Eat a wide variety of protein foods, including meat, poultry, fish, eggs, beans, nuts, and tofu.  · For a 2,000 calorie daily food plan, eat 5½ oz of protein every day.  · 1 oz is equal to:  ? 1 oz meat, poultry, or fish.  ? ¼ cup cooked beans.  ? 1 egg.  ? ½ oz nuts  or seeds.  ? 1 Tbsp peanut butter.  Dairy  · Drink fat-free or low-fat (1%) milk.  · Eat or drink dairy as a side to meals.  · For a 2,000 calorie daily food plan, eat or drink 3 cups of dairy every day.  · 1 cup is equal to:  ? 1 cup milk, yogurt, cottage cheese, or soy milk (soy beverage).  ? 2 oz processed cheese.  ? 1½ oz natural cheese.  Fats, oils, salt, and sugars  · Only small amounts of oils are recommended.  · Avoid foods that are high in calories and low in nutritional value (empty calories), like foods high in fat or added sugars.  · Choose foods that are low in salt (sodium). Choose foods that have less than 140 milligrams (mg) of sodium per serving.  · Drink water instead of sugary drinks. Drink enough water each day to keep your urine pale yellow.  Where to find support  · Work with your health care provider or a nutrition specialist (dietitian) to develop a customized eating plan that is right for you.  · Download an kaveh (mobile application) to help you track your daily food intake.  Where to find more information  · Go to ChooseMyPlate.gov for more information.  Summary  · MyPlate is a general guideline for healthy eating from the USDA. It is based on the 2010 Dietary Guidelines for Americans.  · In general, fruits and vegetables should take up ½ of your plate, grains should take up ¼ of your plate, and protein should take up ¼ of your plate.  This information is not intended to replace advice given to you by your health care provider. Make sure you discuss any questions you have with your health care provider.  Document Revised: 05/21/2020 Document Reviewed: 03/19/2018  Elsevier Patient Education © 2021 Elsevier Inc.

## 2021-03-22 NOTE — TELEPHONE ENCOUNTER
Pt stopped by my window at check out to state that she needed a prior authorization for her vitamin D 2000 mg prescription

## 2021-03-22 NOTE — PROGRESS NOTES
Subjective   Connie May Felty is a 60 y.o. female.     Chief Complaint   Patient presents with   • Hyperlipidemia   • Rash       History of Present Illness   Patient is here to follow-up on cholesterol and due for blood work, she also complains of a rash on the hands, she also is here to follow-up on neuralgia but sees pain clinic this week for further refills on her gabapentin    The following portions of the patient's history were reviewed and updated as appropriate: allergies, current medications, past family history, past medical history, past social history, past surgical history and problem list.    Review of Systems      Current Outpatient Medications:   •  alendronate (FOSAMAX) 70 MG tablet, Take 1 tablet by mouth Every 7 (Seven) Days. Indications: Osteoporosis, Disp: 12 tablet, Rfl: 3  •  Cholecalciferol (Vitamin D3) 50 MCG (2000 UT) capsule, Take 1 capsule by mouth Daily., Disp: 90 capsule, Rfl: 3  •  Cyanocobalamin 1000 MCG/ML kit, Inject 1,000 mcg into the appropriate muscle as directed by prescriber Every 30 (Thirty) Days., Disp: 3 each, Rfl: 2  •  fluticasone (FLONASE) 50 MCG/ACT nasal spray, 2 sprays into the nostril(s) as directed by provider Daily., Disp: 3 bottle, Rfl: 3  •  gabapentin (NEURONTIN) 800 MG tablet, Take 1 tablet by mouth 4 (Four) Times a Day., Disp: 60 tablet, Rfl: 0  •  Insulin Pen Needle (PEN NEEDLES) 30G X 8 MM misc, 1 each 1 (One) Time Per Week., Disp: 90 each, Rfl: 3  •  Syringe, Disposable, 1 ML misc, Use to inject vitamin B12 every 30 days, Disp: 25 each, Rfl: 3  •  topiramate (TOPAMAX) 100 MG tablet, Take 1 tablet by mouth 2 (Two) Times a Day., Disp: 180 tablet, Rfl: 0  •  clotrimazole-betamethasone (Lotrisone) 1-0.05 % cream, Apply  topically to the appropriate area as directed 2 (Two) Times a Day., Disp: 15 g, Rfl: 2  •  sucralfate (Carafate) 1 g tablet, Take 1 tablet by mouth 4 (Four) Times a Day., Disp: 120 tablet, Rfl: 2    Objective     Blood pressure 104/70, pulse 69,  "temperature 97.1 °F (36.2 °C), resp. rate 14, height 160 cm (62.99\"), weight 57.2 kg (126 lb), SpO2 98 %.    Physical Exam  Vitals and nursing note reviewed.   Constitutional:       General: She is not in acute distress.     Appearance: Normal appearance. She is not diaphoretic.   HENT:      Head: Normocephalic and atraumatic.      Right Ear: External ear normal.      Left Ear: External ear normal.      Nose: Nose normal.   Eyes:      Extraocular Movements: Extraocular movements intact.      Conjunctiva/sclera: Conjunctivae normal.   Neck:      Trachea: Trachea normal.   Cardiovascular:      Rate and Rhythm: Normal rate and regular rhythm.      Heart sounds: Normal heart sounds.   Pulmonary:      Effort: Pulmonary effort is normal. No respiratory distress.      Breath sounds: Normal breath sounds.   Abdominal:      General: Abdomen is flat.   Musculoskeletal:      Cervical back: Neck supple.      Comments: Moves all limbs   Skin:     General: Skin is warm.      Findings: Rash present.             Comments:  Right hand skin lesion erythematous   Neurological:      Mental Status: She is alert and oriented to person, place, and time.      Comments: No gross motor or sensory deficits       Patient's Body mass index is 22.33 kg/m². BMI is within normal parameters. No follow-up required..      Results for orders placed or performed in visit on 03/22/21   Comprehensive Metabolic Panel    Specimen: Blood   Result Value Ref Range    Glucose 84 65 - 99 mg/dL    BUN 11 6 - 20 mg/dL    Creatinine 0.64 0.57 - 1.00 mg/dL    eGFR Non African Am 95 >60 mL/min/1.73    eGFR African Am 115 >60 mL/min/1.73    BUN/Creatinine Ratio 17.2 7.0 - 25.0    Sodium 143 136 - 145 mmol/L    Potassium 3.8 3.5 - 5.2 mmol/L    Chloride 107 98 - 107 mmol/L    Total CO2 24.1 22.0 - 29.0 mmol/L    Calcium 9.2 8.6 - 10.5 mg/dL    Total Protein 6.8 6.0 - 8.5 g/dL    Albumin 4.60 3.50 - 5.20 g/dL    Globulin 2.2 gm/dL    A/G Ratio 2.1 g/dL    Total " Bilirubin 0.5 0.0 - 1.2 mg/dL    Alkaline Phosphatase 115 39 - 117 U/L    AST (SGOT) 28 1 - 32 U/L    ALT (SGPT) 25 1 - 33 U/L   Lipid Panel    Specimen: Blood   Result Value Ref Range    Total Cholesterol 204 (H) 0 - 200 mg/dL    Triglycerides 106 0 - 150 mg/dL    HDL Cholesterol 75 (H) 40 - 60 mg/dL    VLDL Cholesterol Oscar 19 5 - 40 mg/dL    LDL Chol Calc (NIH) 110 (H) 0 - 100 mg/dL   CBC & Differential    Specimen: Blood   Result Value Ref Range    WBC 4.23 3.40 - 10.80 10*3/mm3    RBC 4.91 3.77 - 5.28 10*6/mm3    Hemoglobin 12.5 12.0 - 15.9 g/dL    Hematocrit 39.4 34.0 - 46.6 %    MCV 80.2 79.0 - 97.0 fL    MCH 25.5 (L) 26.6 - 33.0 pg    MCHC 31.7 31.5 - 35.7 g/dL    RDW 17.0 (H) 12.3 - 15.4 %    Platelets 276 140 - 450 10*3/mm3    Neutrophil Rel % 41.6 (L) 42.7 - 76.0 %    Lymphocyte Rel % 45.4 (H) 19.6 - 45.3 %    Monocyte Rel % 8.3 5.0 - 12.0 %    Eosinophil Rel % 3.8 0.3 - 6.2 %    Basophil Rel % 0.7 0.0 - 1.5 %    Neutrophils Absolute 1.76 1.70 - 7.00 10*3/mm3    Lymphocytes Absolute 1.92 0.70 - 3.10 10*3/mm3    Monocytes Absolute 0.35 0.10 - 0.90 10*3/mm3    Eosinophils Absolute 0.16 0.00 - 0.40 10*3/mm3    Basophils Absolute 0.03 0.00 - 0.20 10*3/mm3    Immature Granulocyte Rel % 0.2 0.0 - 0.5 %    Immature Grans Absolute 0.01 0.00 - 0.05 10*3/mm3    nRBC 0.0 0.0 - 0.2 /100 WBC         Assessment/Plan   Diagnoses and all orders for this visit:    1. Mixed hyperlipidemia (Primary)  -     CBC & Differential  -     Comprehensive Metabolic Panel  -     Lipid Panel    2. Neuralgia    3. Rash and nonspecific skin eruption  -     clotrimazole-betamethasone (Lotrisone) 1-0.05 % cream; Apply  topically to the appropriate area as directed 2 (Two) Times a Day.  Dispense: 15 g; Refill: 2  -     Ambulatory Referral to Dermatology    Plan:  1.   mixed hyperlipidemia: will obtain   fasting CMP and lipid panel.  Diet and exercise counseled,    2. Rash : Topical Lotrisone, will refer patient to dermatology for further  evaluation  3. Neuralgia  : Patient currently on gabapentin but is seeing pain clinic for further refills this week, she states she has enough gabapentin to last her until then, advised to keep the appointment               Ena Zurita MD

## 2021-03-23 DIAGNOSIS — K21.9 GASTROESOPHAGEAL REFLUX DISEASE: ICD-10-CM

## 2021-03-23 DIAGNOSIS — M79.2 NERVE PAIN: ICD-10-CM

## 2021-03-23 DIAGNOSIS — G89.4 CHRONIC PAIN SYNDROME: ICD-10-CM

## 2021-03-23 DIAGNOSIS — M54.12 CERVICAL RADICULOPATHY: ICD-10-CM

## 2021-03-23 RX ORDER — GABAPENTIN 800 MG/1
800 TABLET ORAL 4 TIMES DAILY
Qty: 60 TABLET | Refills: 0 | OUTPATIENT
Start: 2021-03-23

## 2021-03-23 RX ORDER — SUCRALFATE 1 G/1
1 TABLET ORAL 4 TIMES DAILY
Qty: 120 TABLET | Refills: 2 | Status: SHIPPED | OUTPATIENT
Start: 2021-03-23 | End: 2021-06-02

## 2021-03-23 NOTE — TELEPHONE ENCOUNTER
"[facility] pharmacy faxed a request for refill  Of Sucralfate tabs and Gabapentin 800mg.    Per referral dept, they have not found a pain clinic that will take her. Last office they tried denied to take her as she is \"too much of a risk\".    "

## 2021-04-07 DIAGNOSIS — M54.12 CERVICAL RADICULOPATHY: ICD-10-CM

## 2021-04-07 DIAGNOSIS — M79.2 NERVE PAIN: ICD-10-CM

## 2021-04-07 DIAGNOSIS — G89.4 CHRONIC PAIN SYNDROME: ICD-10-CM

## 2021-04-07 RX ORDER — GABAPENTIN 800 MG/1
800 TABLET ORAL 4 TIMES DAILY
Qty: 60 TABLET | Refills: 0 | OUTPATIENT
Start: 2021-04-07

## 2021-04-07 NOTE — TELEPHONE ENCOUNTER
Pt called back and she did see Dr. Candelario on 3/29/21 however, he will not write gabapentin for her until she is evaluated by a psychiatrist. She states Dr. Candelario told her if the psychiatrist says she is psychotic then he will not write the medication for her. Advised to follow Dr. Candelario's recommendations and be evaluated and then see what he wants to do next. Pt states she is not crazy, just 1 of her kids and her sister says she is. I aplogized she is going through this, but it is in her best option to follow through with his plan. Pt states she will.

## 2021-04-07 NOTE — TELEPHONE ENCOUNTER
Left detailed message for pt that gabapentin must come from pain management. She was scheduled to see Dr. Candelario on 3/29/21 [id number]. If she did not keep that appt, she needs to call his office to reschedule. Insurance will not approve rx vitamin D so she needs to buy that OTC. Call back with any further questions or concerns.

## 2021-04-07 NOTE — TELEPHONE ENCOUNTER
Caller: Felty, Connie May    Relationship: Self    Best call back number: [phone]     Medication needed:   Requested Prescriptions     Pending Prescriptions Disp Refills   • gabapentin (NEURONTIN) 800 MG tablet 60 tablet 0     Sig: Take 1 tablet by mouth 4 (Four) Times a Day.       When do you need the refill by: TODAY    What additional details did the patient provide when requesting the medication: PATIENT IS OUT OF MEDICATION.    Does the patient have less than a 3 day supply:  [x] Yes  [] No    What is the patient's preferred pharmacy: [id number] - [place], [place] - [address] [facility] AT [facility] [address] - [phone] [facility] [phone] FX       PATIENT ALSO ASKED FOR A PRIOR AUTH FOR Cholecalciferol (Vitamin D3) 50 MCG (2000 UT) capsule.

## 2021-04-12 DIAGNOSIS — G89.4 CHRONIC PAIN SYNDROME: ICD-10-CM

## 2021-04-12 DIAGNOSIS — M54.12 CERVICAL RADICULOPATHY: ICD-10-CM

## 2021-04-12 DIAGNOSIS — M79.2 NERVE PAIN: ICD-10-CM

## 2021-04-12 RX ORDER — GABAPENTIN 800 MG/1
800 TABLET ORAL 4 TIMES DAILY
Qty: 60 TABLET | Refills: 0 | OUTPATIENT
Start: 2021-04-12

## 2021-04-12 NOTE — TELEPHONE ENCOUNTER
Caller: [facility], [place] - [address] [facility] [phone] [place] [phone] FX    Relationship: Pharmacy    Best call back number: [phone]    Medication needed:   Requested Prescriptions     Pending Prescriptions Disp Refills   • gabapentin (NEURONTIN) 800 MG tablet 60 tablet 0     Sig: Take 1 tablet by mouth 4 (Four) Times a Day.       When do you need the refill by: 04/12    What additional details did the patient provide when requesting the medication: PATIENT IS OUT OF THE MEDICATION    Does the patient have less than a 3 day supply:  [x] Yes  [] No    What is the patient's preferred pharmacy: [facility] [address] [phone] [place] [phone] FX

## 2021-06-01 DIAGNOSIS — K21.9 GASTROESOPHAGEAL REFLUX DISEASE: ICD-10-CM

## 2021-06-02 ENCOUNTER — OFFICE VISIT (OUTPATIENT)
Dept: INTERNAL MEDICINE | Facility: CLINIC | Age: 61
End: 2021-06-02

## 2021-06-02 ENCOUNTER — PATIENT OUTREACH (OUTPATIENT)
Dept: CASE MANAGEMENT | Facility: OTHER | Age: 61
End: 2021-06-02

## 2021-06-02 VITALS
WEIGHT: 118 LBS | BODY MASS INDEX: 20.91 KG/M2 | HEART RATE: 69 BPM | OXYGEN SATURATION: 98 % | SYSTOLIC BLOOD PRESSURE: 101 MMHG | RESPIRATION RATE: 14 BRPM | TEMPERATURE: 98 F | HEIGHT: 63 IN | DIASTOLIC BLOOD PRESSURE: 60 MMHG

## 2021-06-02 DIAGNOSIS — Z59.00 HOMELESSNESS: Primary | ICD-10-CM

## 2021-06-02 DIAGNOSIS — M54.12 CERVICAL RADICULOPATHY: ICD-10-CM

## 2021-06-02 DIAGNOSIS — G89.4 CHRONIC PAIN SYNDROME: ICD-10-CM

## 2021-06-02 PROCEDURE — 99214 OFFICE O/P EST MOD 30 MIN: CPT | Performed by: INTERNAL MEDICINE

## 2021-06-02 RX ORDER — SUCRALFATE 1 G/1
1 TABLET ORAL 4 TIMES DAILY
Qty: 120 TABLET | Refills: 2 | OUTPATIENT
Start: 2021-06-02

## 2021-06-02 SDOH — ECONOMIC STABILITY - HOUSING INSECURITY: HOMELESSNESS UNSPECIFIED: Z59.00

## 2021-06-02 NOTE — PROGRESS NOTES
Chief Complaint   Patient presents with   • Follow-up     Pt is requesting nursing home placement.   • Hip Pain     left sided, runs down into leg.  Requesting refill of gabaentin.       Subjective     History of Present Illness   Connie May Felty is a 60 y.o. female presenting for follow up.  She shares she wishes to be placed in a nursing home if at all possible.  She was not very talkative on exam today.  I shared with her that she appears physically normal and nursing facility admission would likely be very difficult for her to qualify.  She requested again that I submit her name for placement options.  On further questioning, it became apparent that she was not able to get along with her sister who she is currently living with.  Prior to this, she was living with her son but they were unfortunately evicted.    Patient shared that she wished to be refilled on gabapentin.  When I asked about pain management, she stated that she does not know why she was not able to be accepted at the pain management clinic.  Prior notes do make it clear that the the patient needed a psychiatric evaluation prior to being initiated on treatment with controlled substances.    Patient has stopped all her medications.  She states that her mood is good and does not feel that she needs any psychiatric medications at this time.    The following portions of the patient's history were reviewed and updated as appropriate: allergies, current medications, past family history, past medical history, past social history, past surgical history and problem list.    Review of Systems   Constitutional: Negative for chills, fatigue and fever.   HENT: Negative for congestion, ear pain, rhinorrhea, sinus pressure and sore throat.    Eyes: Negative for visual disturbance.   Respiratory: Negative for cough, chest tightness, shortness of breath and wheezing.    Cardiovascular: Negative for chest pain, palpitations and leg swelling.   Gastrointestinal:  Negative for abdominal pain, blood in stool, constipation, diarrhea, nausea and vomiting.   Endocrine: Negative for polydipsia and polyuria.   Genitourinary: Negative for dysuria and hematuria.   Musculoskeletal: Negative for arthralgias and back pain.   Skin: Negative for rash.   Neurological: Negative for dizziness, light-headedness, numbness and headaches.   Psychiatric/Behavioral: Negative for dysphoric mood and sleep disturbance. The patient is not nervous/anxious.        Allergies   Allergen Reactions   • Aspirin Other (See Comments)     Due to gastric bypass  Due to gastric bypass   • Diclofenac Sodium Swelling   • Nsaids Other (See Comments)     D/t hx of gastric bypass  Gastric bypass   • Lidoderm [Lidocaine] Other (See Comments) and Myalgia     Visual changes, numbness, Pt states she can have injected lidocaine  Per records, visual changes and numbness       Past Medical History:   Diagnosis Date   • Anemia     iron deficiency per records   • Anxiety    • B12 deficiency    • Compression fracture of spine (CMS/[facility])    • Elevated bilirubin 02/2013   • Fractures    • Inflammation of sacroiliac joint (CMS/[facility]) 8/5/2009   • Intussusception of small bowel (CMS/[facility]) 10/29/2013   • Migraine    • Mitral incompetence    • Neuropathy    • Osteoporosis     Has had compression fracture.   • Pelvic fracture (CMS/[facility]) 2014    Slipped on ice. Surgical repair [place], [place]. [facility].   • Postmenopausal     Age 42   • Pulmonary embolism (CMS/[facility]) 10/2013   • Rheumatoid arthritis (CMS/[facility])    • Sepsis (CMS/[facility]) 10/2013   • Small bowel perforation (CMS/[facility]) 2014   • Vitamin D deficiency        Social History     Socioeconomic History   • Marital status:      Spouse name: Not on file   • Number of children: Not on file   • Years of education: Not on file   • Highest education level: Not on file   Tobacco Use   • Smoking status: Never Smoker   • Smokeless tobacco: Never Used   Substance and Sexual  "Activity   • Alcohol use: No   • Drug use: No   • Sexual activity: Defer        Past Surgical History:   Procedure Laterality Date   • BUNIONECTOMY Right    • CERVICAL FUSION  2007    C4-C7   • COLONOSCOPY     • COLONOSCOPY N/A 2/17/2019    Procedure: COLONOSCOPY;  Surgeon: Sammie Steinberg MD;  Location: [facility] ENDOSCOPY;  Service: General   • ENDOSCOPY N/A 2/17/2019    Procedure: ESOPHAGOGASTRODUODENOSCOPY;  Surgeon: Sammie Steinberg MD;  Location: [facility] ENDOSCOPY;  Service: General   • EXPLORATORY LAPAROTOMY W/ BOWEL RESECTION  2013    for intussussception of entero-enterostomy with associated perforation; done at [facility]   • GASTRIC BYPASS  2005    Dr. Pedroza in [facility]   • KNEE ARTHROSCOPY     • NECK SURGERY      C 4-7   • PELVIC FRACTURE SURGERY  2014    s/p mechanical fall on ice; done at [facility] in [place]   • SHOULDER SURGERY     • TOE AMPUTATION Left 2005    Toe #2 and #3 due to trauma   • TOTAL ABDOMINAL HYSTERECTOMY WITH SALPINGO OOPHORECTOMY  2005   • TOTAL HIP ARTHROPLASTY Left 2005, 2007       Family History   Problem Relation Age of Onset   • Arthritis Mother    • Diabetes Mother    • Heart disease Mother         CABG   • Hypertension Mother    • Asthma Mother    • Ovarian cancer Sister    • Lupus Sister    • Lung disease Sister    • Heart disease Sister         3 CABG   • Diabetes Father    • Heart disease Father    • Heart disease Brother         5 CABG   • Heart attack Brother    • Diabetes Sister    • Heart disease Sister    • Breast cancer Neg Hx          Current Outpatient Medications:   •  Cyanocobalamin 1000 MCG/ML kit, Inject 1,000 mcg into the appropriate muscle as directed by prescriber Every 30 (Thirty) Days., Disp: 3 each, Rfl: 2  •  Syringe, Disposable, 1 ML misc, Use to inject vitamin B12 every 30 days, Disp: 25 each, Rfl: 3    Objective   /60   Pulse 69   Temp 98 °F (36.7 °C)   Resp 14   Ht 160 cm (63\")   Wt 53.5 kg (118 lb)   SpO2 98%   BMI 20.90 " "kg/m²     Physical Exam  Vitals and nursing note reviewed.   Constitutional:       Appearance: Normal appearance. She is well-developed.   HENT:      Head: Normocephalic and atraumatic.   Eyes:      Extraocular Movements: Extraocular movements intact.      Conjunctiva/sclera: Conjunctivae normal.   Pulmonary:      Effort: Pulmonary effort is normal.   Musculoskeletal:      Cervical back: Normal range of motion and neck supple.   Skin:     General: Skin is warm and dry.      Findings: No rash.   Neurological:      General: No focal deficit present.      Mental Status: She is alert and oriented to person, place, and time.   Psychiatric:         Mood and Affect: Mood normal.         Behavior: Behavior normal.         Assessment/Plan   Diagnoses and all orders for this visit:    1. Homelessness (Primary)  -     Ambulatory Referral to Case Management Value Based Care    2. Chronic pain syndrome    3. Cervical radiculopathy          Discussion Summary:  Patient is a 60 y.o. female presenting for follow up.    Homelessness   -Patient is requesting to be admitted to a nursing facility so that she \"has a place to call home\".  She has no specific physical limitations or disabilities.  Prior chart history does document significant psychiatric history.  It appears that the patient is frustrated with her current living situation possibly due to domestic issues and financial limitations.  This is better addressed by case management who may be able to offer or locate housing options.    Chronic pain/cervical radiculopathy/sciatica  -Patient has been referred to pain management.  I am not comfortable prescribing gabapentin for long-term management of this patient.  I offered alternative referrals however she then declined referrals.    Follow up:  No follow-ups on file.     "

## 2021-06-02 NOTE — OUTREACH NOTE
"Patient Outreach Note    SW reached out to pt to follow up on referral regarding housing options. Pt stated, \" I have no where to go so I guess I'll just live in a nursing home\". SW explained to pt that just because she is homeless does not qualify for a nursing home. Pt stated, \" Well sometimes my back hurts so I should qualify\". SW asked pt where she was currently living. Pt stated, \" I am couch hopping right now so just wherever. I was living with my son but he made me leave\". SW asked pt if she had any income. Pt stated, \" I get $644 in SSI a month and I get $204 in food stamps each month\". SW told pt about the [employer] locations and provided a list of low income apts\". SW asked pt if she had transportation. Pt stated, \" Yes I have a car\". SW asked pt if she needed any other resources right now. Pt stated, \" No, I guess not\". SW told pt that she will contact her back next week to follow up.     NAOMIE Guevara  Ambulatory     6/2/2021, 16:23 EDT      "

## 2021-06-03 ENCOUNTER — HOSPITAL ENCOUNTER (EMERGENCY)
Facility: HOSPITAL | Age: 61
Discharge: HOME | End: 2021-06-03
Attending: EMERGENCY MEDICINE | Admitting: EMERGENCY MEDICINE

## 2021-06-03 ENCOUNTER — PATIENT OUTREACH (OUTPATIENT)
Dept: CASE MANAGEMENT | Facility: OTHER | Age: 61
End: 2021-06-03

## 2021-06-03 ENCOUNTER — APPOINTMENT (OUTPATIENT)
Dept: GENERAL RADIOLOGY | Facility: HOSPITAL | Age: 61
End: 2021-06-03

## 2021-06-03 VITALS
BODY MASS INDEX: 21.4 KG/M2 | OXYGEN SATURATION: 99 % | HEART RATE: 58 BPM | SYSTOLIC BLOOD PRESSURE: 130 MMHG | RESPIRATION RATE: 16 BRPM | TEMPERATURE: 98.1 F | DIASTOLIC BLOOD PRESSURE: 58 MMHG | WEIGHT: 120.8 LBS | HEIGHT: 63 IN

## 2021-06-03 DIAGNOSIS — Z86.79 HISTORY OF BLOOD PRESSURE PROBLEMS: Primary | ICD-10-CM

## 2021-06-03 DIAGNOSIS — N39.0 URINARY TRACT INFECTION WITHOUT HEMATURIA, SITE UNSPECIFIED: ICD-10-CM

## 2021-06-03 LAB
ALBUMIN SERPL-MCNC: 3.8 G/DL (ref 3.5–5.2)
ALBUMIN/GLOB SERPL: 1.5 G/DL
ALP SERPL-CCNC: 132 U/L (ref 39–117)
ALT SERPL W P-5'-P-CCNC: 20 U/L (ref 1–33)
ANION GAP SERPL CALCULATED.3IONS-SCNC: 7.7 MMOL/L (ref 5–15)
AST SERPL-CCNC: 35 U/L (ref 1–32)
BACTERIA UR QL AUTO: ABNORMAL /HPF
BASOPHILS # BLD AUTO: 0.02 10*3/MM3 (ref 0–0.2)
BASOPHILS NFR BLD AUTO: 0.4 % (ref 0–1.5)
BILIRUB SERPL-MCNC: 0.7 MG/DL (ref 0–1.2)
BILIRUB UR QL STRIP: NEGATIVE
BUN SERPL-MCNC: 9 MG/DL (ref 6–20)
BUN/CREAT SERPL: 13.8 (ref 7–25)
CALCIUM SPEC-SCNC: 9.3 MG/DL (ref 8.6–10.5)
CHLORIDE SERPL-SCNC: 103 MMOL/L (ref 98–107)
CLARITY UR: CLEAR
CO2 SERPL-SCNC: 29.3 MMOL/L (ref 22–29)
COLOR UR: YELLOW
CREAT SERPL-MCNC: 0.65 MG/DL (ref 0.57–1)
DEPRECATED RDW RBC AUTO: 44.8 FL (ref 37–54)
EOSINOPHIL # BLD AUTO: 0.08 10*3/MM3 (ref 0–0.4)
EOSINOPHIL NFR BLD AUTO: 1.6 % (ref 0.3–6.2)
ERYTHROCYTE [DISTWIDTH] IN BLOOD BY AUTOMATED COUNT: 14.3 % (ref 12.3–15.4)
GFR SERPL CREATININE-BSD FRML MDRD: 93 ML/MIN/1.73
GLOBULIN UR ELPH-MCNC: 2.6 GM/DL
GLUCOSE SERPL-MCNC: 94 MG/DL (ref 65–99)
GLUCOSE UR STRIP-MCNC: NEGATIVE MG/DL
HCT VFR BLD AUTO: 40 % (ref 34–46.6)
HGB BLD-MCNC: 12.2 G/DL (ref 12–15.9)
HGB UR QL STRIP.AUTO: NEGATIVE
HYALINE CASTS UR QL AUTO: ABNORMAL /LPF
IMM GRANULOCYTES # BLD AUTO: 0.02 10*3/MM3 (ref 0–0.05)
IMM GRANULOCYTES NFR BLD AUTO: 0.4 % (ref 0–0.5)
KETONES UR QL STRIP: NEGATIVE
LEUKOCYTE ESTERASE UR QL STRIP.AUTO: ABNORMAL
LYMPHOCYTES # BLD AUTO: 1.66 10*3/MM3 (ref 0.7–3.1)
LYMPHOCYTES NFR BLD AUTO: 33.2 % (ref 19.6–45.3)
MAGNESIUM SERPL-MCNC: 2.2 MG/DL (ref 1.6–2.6)
MCH RBC QN AUTO: 26.3 PG (ref 26.6–33)
MCHC RBC AUTO-ENTMCNC: 30.5 G/DL (ref 31.5–35.7)
MCV RBC AUTO: 86.4 FL (ref 79–97)
MONOCYTES # BLD AUTO: 0.4 10*3/MM3 (ref 0.1–0.9)
MONOCYTES NFR BLD AUTO: 8 % (ref 5–12)
NEUTROPHILS NFR BLD AUTO: 2.82 10*3/MM3 (ref 1.7–7)
NEUTROPHILS NFR BLD AUTO: 56.4 % (ref 42.7–76)
NITRITE UR QL STRIP: NEGATIVE
NRBC BLD AUTO-RTO: 0 /100 WBC (ref 0–0.2)
NT-PROBNP SERPL-MCNC: 107.2 PG/ML (ref 0–900)
PH UR STRIP.AUTO: 6 [PH] (ref 5–8)
PLATELET # BLD AUTO: 229 10*3/MM3 (ref 140–450)
PMV BLD AUTO: 9.9 FL (ref 6–12)
POTASSIUM SERPL-SCNC: 4.6 MMOL/L (ref 3.5–5.2)
PROT SERPL-MCNC: 6.4 G/DL (ref 6–8.5)
PROT UR QL STRIP: NEGATIVE
RBC # BLD AUTO: 4.63 10*6/MM3 (ref 3.77–5.28)
RBC # UR: ABNORMAL /HPF
REF LAB TEST METHOD: ABNORMAL
SODIUM SERPL-SCNC: 140 MMOL/L (ref 136–145)
SP GR UR STRIP: 1.01 (ref 1–1.03)
SQUAMOUS #/AREA URNS HPF: ABNORMAL /HPF
TROPONIN T SERPL-MCNC: <0.01 NG/ML (ref 0–0.03)
UROBILINOGEN UR QL STRIP: ABNORMAL
WBC # BLD AUTO: 5 10*3/MM3 (ref 3.4–10.8)
WBC UR QL AUTO: ABNORMAL /HPF

## 2021-06-03 PROCEDURE — 85025 COMPLETE CBC W/AUTO DIFF WBC: CPT | Performed by: EMERGENCY MEDICINE

## 2021-06-03 PROCEDURE — 83735 ASSAY OF MAGNESIUM: CPT | Performed by: EMERGENCY MEDICINE

## 2021-06-03 PROCEDURE — 80053 COMPREHEN METABOLIC PANEL: CPT | Performed by: EMERGENCY MEDICINE

## 2021-06-03 PROCEDURE — 83880 ASSAY OF NATRIURETIC PEPTIDE: CPT | Performed by: EMERGENCY MEDICINE

## 2021-06-03 PROCEDURE — 99283 EMERGENCY DEPT VISIT LOW MDM: CPT

## 2021-06-03 PROCEDURE — 93005 ELECTROCARDIOGRAM TRACING: CPT | Performed by: EMERGENCY MEDICINE

## 2021-06-03 PROCEDURE — 71045 X-RAY EXAM CHEST 1 VIEW: CPT

## 2021-06-03 PROCEDURE — 84484 ASSAY OF TROPONIN QUANT: CPT | Performed by: EMERGENCY MEDICINE

## 2021-06-03 PROCEDURE — 81001 URINALYSIS AUTO W/SCOPE: CPT | Performed by: EMERGENCY MEDICINE

## 2021-06-03 RX ORDER — TRAMADOL HYDROCHLORIDE 50 MG/1
50 TABLET ORAL ONCE
Status: DISCONTINUED | OUTPATIENT
Start: 2021-06-03 | End: 2021-06-03 | Stop reason: HOSPADM

## 2021-06-03 RX ORDER — CEPHALEXIN 500 MG/1
500 CAPSULE ORAL 2 TIMES DAILY
Qty: 10 CAPSULE | Refills: 0 | Status: SHIPPED | OUTPATIENT
Start: 2021-06-03

## 2021-06-03 NOTE — ED PROVIDER NOTES
TRIAGE CHIEF COMPLAINT:     Nursing and triage notes reviewed    Chief Complaint   Patient presents with   • Hypotension   • Hypertension      HPI: Connie May Felty is a 60 y.o. female who presents to the emergency department complaining of blood pressure issues.  Patient states for the past 6 months to a year she occasionally has had low blood pressure.  She states once it dropped into the 80s systolic.  She currently denies any symptoms.  She states that she has no dizziness, chest pain, shortness of breath.  She states she just wants to be checked for a leaky heart valve as she has a history of mitral valve issues.  She otherwise denies complaints.  Patient saw her primary care physician yesterday but states she forgot to mention any of these issues to him at the time.    REVIEW OF SYSTEMS: All other systems reviewed and are negative     PAST MEDICAL HISTORY:   Past Medical History:   Diagnosis Date   • Anemia     iron deficiency per records   • Anxiety    • B12 deficiency    • Compression fracture of spine (CMS/HCC)    • Elevated bilirubin 02/2013   • Fractures    • Inflammation of sacroiliac joint (CMS/HCC) 8/5/2009   • Intussusception of small bowel (CMS/HCC) 10/29/2013   • Migraine    • Mitral incompetence    • Neuropathy    • Osteoporosis     Has had compression fracture.   • Pelvic fracture (CMS/HCC) 2014    Slipped on ice. Surgical repair [place], [place]. [facility].   • Postmenopausal     Age 42   • Pulmonary embolism (CMS/HCC) 10/2013   • Rheumatoid arthritis (CMS/HCC)    • Sepsis (CMS/HCC) 10/2013   • Small bowel perforation (CMS/HCC) 2014   • Vitamin D deficiency         FAMILY HISTORY:   Family History   Problem Relation Age of Onset   • Arthritis Mother    • Diabetes Mother    • Heart disease Mother         CABG   • Hypertension Mother    • Asthma Mother    • Ovarian cancer Sister    • Lupus Sister    • Lung disease Sister    • Heart disease Sister         3 CABG   • Diabetes Father    •  Heart disease Father    • Heart disease Brother         5 CABG   • Heart attack Brother    • Diabetes Sister    • Heart disease Sister    • Breast cancer Neg Hx         SOCIAL HISTORY:   Social History     Socioeconomic History   • Marital status:      Spouse name: Not on file   • Number of children: Not on file   • Years of education: Not on file   • Highest education level: Not on file   Tobacco Use   • Smoking status: Never Smoker   • Smokeless tobacco: Never Used   Substance and Sexual Activity   • Alcohol use: No   • Drug use: No   • Sexual activity: Defer        SURGICAL HISTORY:   Past Surgical History:   Procedure Laterality Date   • BUNIONECTOMY Right    • CERVICAL FUSION  2007    C4-C7   • COLONOSCOPY     • COLONOSCOPY N/A 2/17/2019    Procedure: COLONOSCOPY;  Surgeon: Sammie Steinberg MD;  Location: [facility] ENDOSCOPY;  Service: General   • ENDOSCOPY N/A 2/17/2019    Procedure: ESOPHAGOGASTRODUODENOSCOPY;  Surgeon: Sammie Steinberg MD;  Location: [facility] ENDOSCOPY;  Service: General   • EXPLORATORY LAPAROTOMY W/ BOWEL RESECTION  2013    for intussussception of entero-enterostomy with associated perforation; done at [facility]   • GASTRIC BYPASS  2005    Dr. Pedroza in [facility]   • KNEE ARTHROSCOPY     • NECK SURGERY      C 4-7   • PELVIC FRACTURE SURGERY  2014    s/p mechanical fall on ice; done at [facility] [place]   • SHOULDER SURGERY     • TOE AMPUTATION Left 2005    Toe #2 and #3 due to trauma   • TOTAL ABDOMINAL HYSTERECTOMY WITH SALPINGO OOPHORECTOMY  2005   • TOTAL HIP ARTHROPLASTY Left 2005, 2007        CURRENT MEDICATIONS:      Medication List      CONTINUE taking these medications    Syringe (Disposable) 1 ML misc  Use to inject vitamin B12 every 30 days        ASK your doctor about these medications    Cyanocobalamin 1000 MCG/ML kit  Inject 1,000 mcg into the appropriate muscle as directed by prescriber Every 30 (Thirty) Days.             ALLERGIES: Aspirin, Diclofenac  sodium, Nsaids, and Lidoderm [lidocaine]     PHYSICAL EXAM:   VITAL SIGNS:   Vitals:    06/03/21 1214   BP:    Pulse:    Resp:    Temp:    SpO2: 92%      CONSTITUTIONAL: Awake, oriented, appears non-toxic   HENT: Atraumatic, normocephalic, oral mucosa pink and moist, airway patent. Nares patent without drainage. External ears normal.   EYES: Conjunctiva clear   NECK: Trachea midline  CARDIOVASCULAR: Normal heart rate, Normal rhythm, No large murmur appreciated at this time.  PULMONARY/CHEST: Clear to auscultation, no rhonchi, wheezes, or rales. Symmetrical breath sounds.  ABDOMINAL: Non-distended, soft, non-tender - no rebound or guarding.  NEUROLOGIC: Non-focal, moving all four extremities, no gross sensory or motor deficits.   EXTREMITIES: No clubbing, cyanosis, or edema   SKIN: Warm, Dry, No erythema, No rash     ED COURSE / MEDICAL DECISION MAKING:   Connie May Felty is a 60 y.o. female who presents to the emergency department for evaluation of blood pressure issues.  Patient is normotensive on arrival in the emergency department with stable vital signs.  Patient otherwise denies complaint currently.  Physical examination on arrival is largely unremarkable.  Will obtain a chest x-ray, EKG, basic labs for evaluation.    I did review note from her primary care physician's office yesterday where patient had asked for placement in a nursing home due to housing issues.  Patient makes no mention of that at this time today.    Chest x-ray per radiology interpretation does not reveal any obvious acute process.    EKG interpreted by me reveals sinus rhythm with rate of 58 bpm.  There are some nonspecific ST-T wave changes.  No obvious acute ischemic findings.  This is an atypical appearing EKG.    Patient's labs were largely unremarkable.  Urinalysis showed possible slight urinary infection.  This will be treated with antibiotics.  Patient had no complaints while in the emergency department.  Will refer to cardiology for  further evaluation.    DECISION TO DISCHARGE/ADMIT: see ED care timeline     FINAL IMPRESSION:   1 --urinary tract infection  2 --history of blood pressure problems  3 --     Electronically signed by: Sugey Harrison MD, 6/3/2021 13:07 Sugey Tarango MD  06/03/21 1519

## 2021-06-03 NOTE — OUTREACH NOTE
"Patient Outreach Note    SW received a call from pt stating \" Someone gave me your number and they said that you had information on [employer]\". SW explained that they spoke yesterday and SW gave pt the info to the [employer] and other housing options. Pt stated that she did not remember that conversation. Pt went on to say \" Well my heart issues is I got a leaky valve in my heart and I never got a stimulus check\". SW told pt to follow up with her PCP if she had questions about her medical issues and SW has resources for financial assistance. Pt stated, \" Well I have food stamps and I don't have any rent or utiliies to pay right now so I don't need any help\". Pt hung up.     NAOMIE Guevara  Ambulatory     6/3/2021, 09:24 EDT      "

## 2021-06-18 ENCOUNTER — TELEPHONE (OUTPATIENT)
Dept: INTERNAL MEDICINE | Facility: CLINIC | Age: 61
End: 2021-06-18

## 2021-06-18 DIAGNOSIS — E53.8 VITAMIN B 12 DEFICIENCY: ICD-10-CM

## 2021-06-18 NOTE — TELEPHONE ENCOUNTER
PATIENT CALLED TO CHANGE HER PHARMACY AND TO REPORT THAT SHE STILL TAKES HER 1000MG OF B12, 2000 UNITS OF VITAMIN D AND FOSMAX 70MG. PATIENT IS ASKING FOR REFILLS TO BE SENT TO HER PHARMACY [id number] [place] [address] [address] AT [facility] [phone] [facility] [phone] FX

## 2021-06-21 RX ORDER — ALENDRONATE SODIUM 70 MG/1
70 TABLET ORAL
Qty: 12 TABLET | Refills: 3 | Status: SHIPPED | OUTPATIENT
Start: 2021-06-21

## 2021-06-21 RX ORDER — ACETAMINOPHEN 160 MG
2000 TABLET,DISINTEGRATING ORAL DAILY
Qty: 30 CAPSULE | Refills: 11 | Status: SHIPPED | OUTPATIENT
Start: 2021-06-21 | End: 2022-06-21

## 2022-07-01 NOTE — ED PROVIDER NOTES
Subjective   Patient is here complaint of some rectal bleeding intermittently over the past year patient has had recent admission here for similar symptoms patient states after her discharge from here last month she has had some intermittent bleeding occasionally but noticed some more bleeding Friday with a bowel movement a little bit yesterday, Saturday and apparently one episode today some abdominal cramps associated no fevers chills no chest pain shortness of air patient has follow-up scheduled in a couple of days with Dr. Chrystal Steinberg who has seen the patient and apparently on her last admission she did get a colonoscopy/she is not on any blood thinners        History provided by:  Patient      Review of Systems   Constitutional: Negative.    Eyes: Negative.    Respiratory: Negative.    Cardiovascular: Negative.    Gastrointestinal: Positive for abdominal pain and blood in stool.   Genitourinary: Negative.    Musculoskeletal: Positive for arthralgias.   Skin: Negative.    Neurological: Negative.    Psychiatric/Behavioral: The patient is nervous/anxious.    All other systems reviewed and are negative.      Past Medical History:   Diagnosis Date   • Anemia     iron deficiency per records   • Anxiety    • B12 deficiency    • Compression fracture of spine (CMS/[facility])    • Elevated bilirubin 02/2013   • Fractures    • Inflammation of sacroiliac joint (CMS/HCC) 8/5/2009   • Intussusception of small bowel (CMS/HCC) 10/29/2013   • Migraine    • Mitral incompetence    • Neuropathy    • Osteoporosis     Has had compression fracture.   • Pelvic fracture (CMS/HCC) 2014    Slipped on ice. Surgical repair [place], [place]. [facility].   • Postmenopausal     Age 42   • Pulmonary embolism (CMS/HCC) 10/2013   • Rheumatoid arthritis (CMS/HCC)    • Sepsis (CMS/HCC) 10/2013   • Small bowel perforation (CMS/HCC) 2014   • Vitamin D deficiency        Allergies   Allergen Reactions   • Aspirin Other (See Comments)     Due to  gastric bypass   • Diclofenac Sodium Swelling   • Nsaids Other (See Comments)     D/t hx of gastric bypass  Gastric bypass   • Lidoderm [Lidocaine] Other (See Comments) and Myalgia     Visual changes, numbness, Pt states she can have injected lidocaine  Per records, visual changes and numbness       Past Surgical History:   Procedure Laterality Date   • BUNIONECTOMY Right    • CERVICAL FUSION  2007    C4-C7   • COLONOSCOPY     • COLONOSCOPY N/A 2/17/2019    Procedure: COLONOSCOPY;  Surgeon: Sammie Steinberg MD;  Location: [facility] ENDOSCOPY;  Service: General   • ENDOSCOPY N/A 2/17/2019    Procedure: ESOPHAGOGASTRODUODENOSCOPY;  Surgeon: Sammie Steinberg MD;  Location: [facility] ENDOSCOPY;  Service: General   • EXPLORATORY LAPAROTOMY W/ BOWEL RESECTION  2013    for intussussception of entero-enterostomy with associated perforation; done at [facility]   • GASTRIC BYPASS  2005    Dr. Pedroza in [facility]   • KNEE ARTHROSCOPY     • NECK SURGERY      C 4-7   • PELVIC FRACTURE SURGERY  2014    s/p mechanical fall on ice; done at [facility] in [place]   • SHOULDER SURGERY     • TOE AMPUTATION Left 2005    Toe #2 and #3 due to trauma   • TOTAL ABDOMINAL HYSTERECTOMY WITH SALPINGO OOPHORECTOMY  2005   • TOTAL HIP ARTHROPLASTY Left 2005, 2007       Family History   Problem Relation Age of Onset   • Arthritis Mother    • Diabetes Mother    • Heart disease Mother         CABG   • Hypertension Mother    • Asthma Mother    • Ovarian cancer Sister    • Lupus Sister    • Lung disease Sister    • Heart disease Sister         3 CABG   • Diabetes Father    • Heart disease Father    • Heart disease Brother         5 CABG   • Heart attack Brother    • Diabetes Sister    • Heart disease Sister        Social History     Socioeconomic History   • Marital status:      Spouse name: Not on file   • Number of children: Not on file   • Years of education: Not on file   • Highest education level: Not on file   Tobacco Use   •  Smoking status: Never Smoker   • Smokeless tobacco: Never Used   Substance and Sexual Activity   • Alcohol use: No   • Drug use: No   • Sexual activity: Defer           Objective   Physical Exam   Constitutional: She is oriented to person, place, and time. She appears well-developed and well-nourished.   Ambulatory in the exam room well-appearing nontoxic no acute distress   HENT:   Head: Normocephalic and atraumatic.   Mouth/Throat: Oropharynx is clear and moist.   Eyes: Conjunctivae and EOM are normal. Pupils are equal, round, and reactive to light.   Neck: Normal range of motion. Neck supple.   Cardiovascular: Normal rate, regular rhythm and intact distal pulses.   Pulmonary/Chest: Effort normal and breath sounds normal.   Abdominal: Soft. She exhibits no mass. There is tenderness. There is no guarding.   mild tenderness epigastrium no rebound or guarding   Genitourinary: Rectal exam shows guaiac negative stool.   Genitourinary Comments: Rectal exam unremarkable there is no blood no melena some light brown stool is noted on digital exam   Musculoskeletal: Normal range of motion. She exhibits no edema.   Neurological: She is alert and oriented to person, place, and time. No cranial nerve deficit or sensory deficit. She exhibits normal muscle tone. Coordination normal.   Skin: Skin is warm. Capillary refill takes less than 2 seconds. She is not diaphoretic.   Psychiatric: She has a normal mood and affect. Her behavior is normal. Judgment and thought content normal.   Nursing note and vitals reviewed.      Procedures           ED Course  ED Course as of Mar 03 1255   Sun Mar 03, 2019   1237 Patient resting comfortably no distress.... Laboratory workup and imaging studies all unremarkable we will plan on discharge home continue follow-up with her gastro-, surgery specialist as scheduled return here for any problems concerns otherwise.... In case labs management reviewed Dr Crawley  [SC]      ED Course User Index  [SC]  Petey Mendiola PA-C                  [facility]  Number of Diagnoses or Management Options     Amount and/or Complexity of Data Reviewed  Review and summarize past medical records: yes  Discuss the patient with other providers: yes    Risk of Complications, Morbidity, and/or Mortality  Presenting problems: moderate  Diagnostic procedures: low  Management options: moderate    Patient Progress  Patient progress: stable        Final diagnoses:   Rectal bleeding   Acute lower UTI (urinary tract infection)            Petey Mendiola PA-C  03/03/19 125     Hide Additional Notes?: No Detail Level: Detailed
